# Patient Record
Sex: FEMALE | Race: BLACK OR AFRICAN AMERICAN | NOT HISPANIC OR LATINO
[De-identification: names, ages, dates, MRNs, and addresses within clinical notes are randomized per-mention and may not be internally consistent; named-entity substitution may affect disease eponyms.]

---

## 2017-02-02 ENCOUNTER — APPOINTMENT (OUTPATIENT)
Dept: PULMONOLOGY | Facility: CLINIC | Age: 62
End: 2017-02-02

## 2017-02-02 VITALS
HEIGHT: 62 IN | BODY MASS INDEX: 31.47 KG/M2 | OXYGEN SATURATION: 91 % | WEIGHT: 171 LBS | SYSTOLIC BLOOD PRESSURE: 120 MMHG | RESPIRATION RATE: 17 BRPM | HEART RATE: 73 BPM | TEMPERATURE: 97.2 F | DIASTOLIC BLOOD PRESSURE: 70 MMHG

## 2017-02-03 LAB
ALBUMIN SERPL ELPH-MCNC: 3.8 G/DL
ALP BLD-CCNC: 98 U/L
ALT SERPL-CCNC: 9 U/L
ANION GAP SERPL CALC-SCNC: 14 MMOL/L
AST SERPL-CCNC: 19 U/L
BASOPHILS # BLD AUTO: 0.01 K/UL
BASOPHILS NFR BLD AUTO: 0.2 %
BILIRUB SERPL-MCNC: 0.4 MG/DL
BUN SERPL-MCNC: 15 MG/DL
CALCIUM SERPL-MCNC: 9.8 MG/DL
CHLORIDE SERPL-SCNC: 101 MMOL/L
CO2 SERPL-SCNC: 24 MMOL/L
CREAT SERPL-MCNC: 0.82 MG/DL
EOSINOPHIL # BLD AUTO: 0.35 K/UL
EOSINOPHIL NFR BLD AUTO: 7.4 %
GLUCOSE SERPL-MCNC: 78 MG/DL
HCT VFR BLD CALC: 35.6 %
HGB BLD-MCNC: 10.6 G/DL
IMM GRANULOCYTES NFR BLD AUTO: 0.2 %
LYMPHOCYTES # BLD AUTO: 1.85 K/UL
LYMPHOCYTES NFR BLD AUTO: 39.4 %
MAN DIFF?: NORMAL
MCHC RBC-ENTMCNC: 26.2 PG
MCHC RBC-ENTMCNC: 29.8 GM/DL
MCV RBC AUTO: 88.1 FL
MONOCYTES # BLD AUTO: 0.39 K/UL
MONOCYTES NFR BLD AUTO: 8.3 %
NEUTROPHILS # BLD AUTO: 2.09 K/UL
NEUTROPHILS NFR BLD AUTO: 44.5 %
PLATELET # BLD AUTO: 214 K/UL
POTASSIUM SERPL-SCNC: 3.9 MMOL/L
PROT SERPL-MCNC: 8.6 G/DL
RBC # BLD: 4.04 M/UL
RBC # FLD: 15.9 %
SODIUM SERPL-SCNC: 139 MMOL/L
TSH SERPL-ACNC: 1.8 UIU/ML
WBC # FLD AUTO: 4.7 K/UL

## 2017-05-01 ENCOUNTER — APPOINTMENT (OUTPATIENT)
Dept: PULMONOLOGY | Facility: CLINIC | Age: 62
End: 2017-05-01

## 2017-05-01 VITALS
OXYGEN SATURATION: 90 % | BODY MASS INDEX: 31.47 KG/M2 | TEMPERATURE: 97.8 F | HEART RATE: 82 BPM | WEIGHT: 171 LBS | HEIGHT: 62 IN | RESPIRATION RATE: 17 BRPM

## 2017-05-30 ENCOUNTER — MEDICATION RENEWAL (OUTPATIENT)
Age: 62
End: 2017-05-30

## 2017-10-13 ENCOUNTER — APPOINTMENT (OUTPATIENT)
Dept: PULMONOLOGY | Facility: CLINIC | Age: 62
End: 2017-10-13
Payer: MEDICAID

## 2017-10-13 VITALS
RESPIRATION RATE: 18 BRPM | WEIGHT: 170 LBS | HEIGHT: 62 IN | DIASTOLIC BLOOD PRESSURE: 70 MMHG | TEMPERATURE: 97.8 F | HEART RATE: 89 BPM | SYSTOLIC BLOOD PRESSURE: 120 MMHG | BODY MASS INDEX: 31.28 KG/M2

## 2017-10-13 PROCEDURE — 36415 COLL VENOUS BLD VENIPUNCTURE: CPT

## 2017-10-13 PROCEDURE — 99215 OFFICE O/P EST HI 40 MIN: CPT | Mod: 25

## 2017-10-16 LAB
25(OH)D3 SERPL-MCNC: 24.1 NG/ML
ALBUMIN SERPL ELPH-MCNC: 3.8 G/DL
ALP BLD-CCNC: 96 U/L
ALT SERPL-CCNC: 10 U/L
ANION GAP SERPL CALC-SCNC: 16 MMOL/L
AST SERPL-CCNC: 13 U/L
BASOPHILS # BLD AUTO: 0.01 K/UL
BASOPHILS NFR BLD AUTO: 0.2 %
BILIRUB SERPL-MCNC: 0.3 MG/DL
BUN SERPL-MCNC: 15 MG/DL
CALCIUM SERPL-MCNC: 9.9 MG/DL
CHLORIDE SERPL-SCNC: 103 MMOL/L
CO2 SERPL-SCNC: 25 MMOL/L
CREAT SERPL-MCNC: 0.94 MG/DL
DEPRECATED KAPPA LC FREE/LAMBDA SER: 2.28 RATIO
EOSINOPHIL # BLD AUTO: 0.27 K/UL
EOSINOPHIL NFR BLD AUTO: 5.3 %
GLUCOSE SERPL-MCNC: 104 MG/DL
HBA1C MFR BLD HPLC: 6 %
HCT VFR BLD CALC: 32.8 %
HGB BLD-MCNC: 10.2 G/DL
IGA SER QL IEP: 377 MG/DL
IGG SER QL IEP: 2120 MG/DL
IGM SER QL IEP: 99 MG/DL
IMM GRANULOCYTES NFR BLD AUTO: 0 %
KAPPA LC CSF-MCNC: 2.18 MG/DL
KAPPA LC SERPL-MCNC: 4.98 MG/DL
LYMPHOCYTES # BLD AUTO: 1.22 K/UL
LYMPHOCYTES NFR BLD AUTO: 24 %
MAN DIFF?: NORMAL
MCHC RBC-ENTMCNC: 27.1 PG
MCHC RBC-ENTMCNC: 31.1 GM/DL
MCV RBC AUTO: 87.2 FL
MONOCYTES # BLD AUTO: 0.29 K/UL
MONOCYTES NFR BLD AUTO: 5.7 %
NEUTROPHILS # BLD AUTO: 3.3 K/UL
NEUTROPHILS NFR BLD AUTO: 64.8 %
PLATELET # BLD AUTO: 203 K/UL
POTASSIUM SERPL-SCNC: 4.1 MMOL/L
PROT SERPL-MCNC: 8.6 G/DL
RBC # BLD: 3.76 M/UL
RBC # FLD: 15.7 %
SODIUM SERPL-SCNC: 144 MMOL/L
TSH SERPL-ACNC: 1.97 UIU/ML
WBC # FLD AUTO: 5.09 K/UL

## 2017-10-17 LAB — TOTAL IGE SMQN RAST: 119 KU/L

## 2017-11-01 ENCOUNTER — MEDICATION RENEWAL (OUTPATIENT)
Age: 62
End: 2017-11-01

## 2017-11-29 ENCOUNTER — MEDICATION RENEWAL (OUTPATIENT)
Age: 62
End: 2017-11-29

## 2018-03-20 ENCOUNTER — MED ADMIN CHARGE (OUTPATIENT)
Age: 63
End: 2018-03-20

## 2018-03-20 ENCOUNTER — APPOINTMENT (OUTPATIENT)
Dept: PULMONOLOGY | Facility: CLINIC | Age: 63
End: 2018-03-20
Payer: MEDICAID

## 2018-03-20 VITALS
HEART RATE: 95 BPM | RESPIRATION RATE: 18 BRPM | TEMPERATURE: 98.6 F | WEIGHT: 167 LBS | OXYGEN SATURATION: 91 % | BODY MASS INDEX: 30.73 KG/M2 | DIASTOLIC BLOOD PRESSURE: 84 MMHG | SYSTOLIC BLOOD PRESSURE: 130 MMHG | HEIGHT: 62 IN

## 2018-03-20 DIAGNOSIS — R06.2 WHEEZING: ICD-10-CM

## 2018-03-20 PROCEDURE — 94640 AIRWAY INHALATION TREATMENT: CPT

## 2018-03-20 PROCEDURE — 96372 THER/PROPH/DIAG INJ SC/IM: CPT

## 2018-03-20 PROCEDURE — 99215 OFFICE O/P EST HI 40 MIN: CPT | Mod: 25

## 2018-03-20 RX ORDER — ALBUTEROL SULFATE 2.5 MG/3ML
(2.5 MG/3ML) SOLUTION RESPIRATORY (INHALATION) 4 TIMES DAILY
Refills: 0 | Status: COMPLETED | OUTPATIENT
Start: 2018-03-20

## 2018-03-20 RX ORDER — METHYLPREDNISOLONE 40 MG/ML
40 INJECTION, POWDER, LYOPHILIZED, FOR SOLUTION INTRAMUSCULAR; INTRAVENOUS
Qty: 1 | Refills: 0 | Status: COMPLETED | OUTPATIENT
Start: 2018-03-20

## 2018-03-20 RX ADMIN — ALBUTEROL SULFATE 0 0.083%: 2.5 SOLUTION RESPIRATORY (INHALATION) at 00:00

## 2018-03-20 RX ADMIN — Medication 1 MG: at 00:00

## 2018-03-21 ENCOUNTER — MOBILE ON CALL (OUTPATIENT)
Age: 63
End: 2018-03-21

## 2018-03-30 ENCOUNTER — MEDICATION RENEWAL (OUTPATIENT)
Age: 63
End: 2018-03-30

## 2018-04-24 ENCOUNTER — APPOINTMENT (OUTPATIENT)
Dept: PULMONOLOGY | Facility: CLINIC | Age: 63
End: 2018-04-24
Payer: MEDICARE

## 2018-04-24 VITALS
WEIGHT: 166 LBS | TEMPERATURE: 98 F | OXYGEN SATURATION: 95 % | RESPIRATION RATE: 18 BRPM | SYSTOLIC BLOOD PRESSURE: 100 MMHG | BODY MASS INDEX: 30.55 KG/M2 | DIASTOLIC BLOOD PRESSURE: 60 MMHG | HEART RATE: 85 BPM | HEIGHT: 62 IN

## 2018-04-24 PROCEDURE — 99215 OFFICE O/P EST HI 40 MIN: CPT | Mod: 25

## 2018-04-24 PROCEDURE — 36415 COLL VENOUS BLD VENIPUNCTURE: CPT

## 2018-04-25 LAB
ALBUMIN SERPL ELPH-MCNC: 3.8 G/DL
ALP BLD-CCNC: 85 U/L
ALT SERPL-CCNC: 13 U/L
ANION GAP SERPL CALC-SCNC: 13 MMOL/L
AST SERPL-CCNC: 17 U/L
BASOPHILS # BLD AUTO: 0.01 K/UL
BASOPHILS NFR BLD AUTO: 0.2 %
BILIRUB SERPL-MCNC: 0.2 MG/DL
BUN SERPL-MCNC: 21 MG/DL
CALCIUM SERPL-MCNC: 9.2 MG/DL
CHLORIDE SERPL-SCNC: 98 MMOL/L
CO2 SERPL-SCNC: 25 MMOL/L
CREAT SERPL-MCNC: 0.88 MG/DL
EOSINOPHIL # BLD AUTO: 0.19 K/UL
EOSINOPHIL NFR BLD AUTO: 3.8 %
FERRITIN SERPL-MCNC: 43 NG/ML
GLUCOSE SERPL-MCNC: 90 MG/DL
HCT VFR BLD CALC: 38.3 %
HGB BLD-MCNC: 11.5 G/DL
IMM GRANULOCYTES NFR BLD AUTO: 0.2 %
LYMPHOCYTES # BLD AUTO: 1.73 K/UL
LYMPHOCYTES NFR BLD AUTO: 34.3 %
MAN DIFF?: NORMAL
MCHC RBC-ENTMCNC: 25.7 PG
MCHC RBC-ENTMCNC: 30 GM/DL
MCV RBC AUTO: 85.7 FL
MONOCYTES # BLD AUTO: 0.47 K/UL
MONOCYTES NFR BLD AUTO: 9.3 %
NEUTROPHILS # BLD AUTO: 2.64 K/UL
NEUTROPHILS NFR BLD AUTO: 52.2 %
PLATELET # BLD AUTO: 215 K/UL
POTASSIUM SERPL-SCNC: 4.4 MMOL/L
PROT SERPL-MCNC: 8.5 G/DL
RBC # BLD: 4.47 M/UL
RBC # FLD: 17.1 %
SODIUM SERPL-SCNC: 136 MMOL/L
WBC # FLD AUTO: 5.05 K/UL

## 2018-08-02 ENCOUNTER — MEDICATION RENEWAL (OUTPATIENT)
Age: 63
End: 2018-08-02

## 2018-09-06 ENCOUNTER — APPOINTMENT (OUTPATIENT)
Dept: PULMONOLOGY | Facility: CLINIC | Age: 63
End: 2018-09-06

## 2018-09-06 ENCOUNTER — APPOINTMENT (OUTPATIENT)
Dept: PULMONOLOGY | Facility: CLINIC | Age: 63
End: 2018-09-06
Payer: MEDICAID

## 2018-09-06 VITALS
WEIGHT: 176 LBS | DIASTOLIC BLOOD PRESSURE: 70 MMHG | HEIGHT: 62 IN | RESPIRATION RATE: 16 BRPM | TEMPERATURE: 98.4 F | BODY MASS INDEX: 32.39 KG/M2 | HEART RATE: 81 BPM | SYSTOLIC BLOOD PRESSURE: 110 MMHG

## 2018-09-06 PROCEDURE — 94618 PULMONARY STRESS TESTING: CPT

## 2018-09-06 PROCEDURE — 99215 OFFICE O/P EST HI 40 MIN: CPT

## 2018-09-06 RX ORDER — AZITHROMYCIN 250 MG/1
250 TABLET, FILM COATED ORAL
Qty: 5 | Refills: 0 | Status: DISCONTINUED | COMMUNITY
Start: 2018-03-20 | End: 2018-09-06

## 2018-09-10 LAB
ALBUMIN SERPL ELPH-MCNC: 4 G/DL
ALP BLD-CCNC: 87 U/L
ALT SERPL-CCNC: 11 U/L
ANION GAP SERPL CALC-SCNC: 16 MMOL/L
AST SERPL-CCNC: 21 U/L
BILIRUB SERPL-MCNC: 0.2 MG/DL
BUN SERPL-MCNC: 16 MG/DL
CALCIUM SERPL-MCNC: 9.8 MG/DL
CHLORIDE SERPL-SCNC: 103 MMOL/L
CO2 SERPL-SCNC: 25 MMOL/L
CREAT SERPL-MCNC: 0.86 MG/DL
NT-PROBNP SERPL-MCNC: 45 PG/ML
POTASSIUM SERPL-SCNC: 4.3 MMOL/L
PROT SERPL-MCNC: 8.5 G/DL
SODIUM SERPL-SCNC: 144 MMOL/L
TSH SERPL-ACNC: 1.53 UIU/ML

## 2018-09-17 ENCOUNTER — MEDICATION RENEWAL (OUTPATIENT)
Age: 63
End: 2018-09-17

## 2018-09-20 ENCOUNTER — MOBILE ON CALL (OUTPATIENT)
Age: 63
End: 2018-09-20

## 2018-12-11 ENCOUNTER — APPOINTMENT (OUTPATIENT)
Dept: PULMONOLOGY | Facility: CLINIC | Age: 63
End: 2018-12-11

## 2018-12-20 ENCOUNTER — MEDICATION RENEWAL (OUTPATIENT)
Age: 63
End: 2018-12-20

## 2019-02-14 ENCOUNTER — MEDICATION RENEWAL (OUTPATIENT)
Age: 64
End: 2019-02-14

## 2019-03-12 ENCOUNTER — APPOINTMENT (OUTPATIENT)
Dept: PULMONOLOGY | Facility: CLINIC | Age: 64
End: 2019-03-12
Payer: MEDICARE

## 2019-03-12 ENCOUNTER — APPOINTMENT (OUTPATIENT)
Dept: PULMONOLOGY | Facility: CLINIC | Age: 64
End: 2019-03-12
Payer: MEDICAID

## 2019-03-12 VITALS
SYSTOLIC BLOOD PRESSURE: 123 MMHG | HEART RATE: 75 BPM | DIASTOLIC BLOOD PRESSURE: 80 MMHG | HEIGHT: 62 IN | WEIGHT: 170 LBS | RESPIRATION RATE: 16 BRPM | TEMPERATURE: 98.1 F | BODY MASS INDEX: 31.28 KG/M2

## 2019-03-12 PROCEDURE — 99215 OFFICE O/P EST HI 40 MIN: CPT | Mod: 25

## 2019-03-12 PROCEDURE — 36415 COLL VENOUS BLD VENIPUNCTURE: CPT

## 2019-03-12 PROCEDURE — 94618 PULMONARY STRESS TESTING: CPT

## 2019-03-12 PROCEDURE — ZZZZZ: CPT

## 2019-03-12 NOTE — PHYSICAL EXAM
[General Appearance - Well Developed] : well developed [Normal Appearance] : normal appearance [Well Groomed] : well groomed [General Appearance - Well Nourished] : well nourished [No Deformities] : no deformities [General Appearance - In No Acute Distress] : no acute distress [Normal Conjunctiva] : the conjunctiva exhibited no abnormalities [Eyelids - No Xanthelasma] : the eyelids demonstrated no xanthelasmas [II] : II [Neck Appearance] : the appearance of the neck was normal [Neck Cervical Mass (___cm)] : no neck mass was observed [Jugular Venous Distention Increased] : there was no jugular-venous distention [Thyroid Diffuse Enlargement] : the thyroid was not enlarged [Thyroid Nodule] : there were no palpable thyroid nodules [Heart Rate And Rhythm] : heart rate and rhythm were normal [Heart Sounds] : normal S1 and S2 [Exaggerated Use Of Accessory Muscles For Inspiration] : no accessory muscle use [Abdomen Soft] : soft [Abdomen Tenderness] : non-tender [] : no hepato-splenomegaly [Abdomen Mass (___ Cm)] : no abdominal mass palpated [Abnormal Walk] : normal gait [Gait - Sufficient For Exercise Testing] : the gait was sufficient for exercise testing [Cyanosis, Localized] : no localized cyanosis [Deep Tendon Reflexes (DTR)] : deep tendon reflexes were 2+ and symmetric [Sensation] : the sensory exam was normal to light touch and pinprick [No Focal Deficits] : no focal deficits [Impaired Insight] : insight and judgment were intact [Normal Oral Mucosa] : normal oral mucosa [No Oral Pallor] : no oral pallor [No Oral Cyanosis] : no oral cyanosis [Oriented To Time, Place, And Person] : oriented to person, place, and time [Affect] : the affect was normal [Mood] : the mood was normal [No Anxiety] : not feeling anxious [FreeTextEntry1] : clubbing, no pedal edema\par

## 2019-03-12 NOTE — CONSULT LETTER
[Dear  ___] : Dear  [unfilled], [DrVu  ___] : Dr. YORK [DrVu ___] : Dr. YORK [Mary Perez MD, FCCP] : Mary Perez MD, FCCP [Director, Pulmonary Hypertension Program] : Director, Pulmonary Hypertension Program [[facility]] : [facility] [Division of Pulmonary, Critical Care and Sleep Medicine] : Division of Pulmonary, Critical Care and Sleep Medicine [Associate Professor of Medicine] : Associate Professor of Medicine

## 2019-03-12 NOTE — HISTORY OF PRESENT ILLNESS
[None] : The patient is currently asymptomatic [Difficulty Breathing During Exertion] : stable dyspnea on exertion [Oxygen] : the patient uses supplemental oxygen [Oxygen Rate  ___ lpm] : Oxygen rate is [unfilled] lpm [Class II - Mild Symptoms and Slight Limitations] : II [Group I - Pulmonary Arterial HTN] : Group I - Pulmonary Arterial HTN: [Collagen Vascular Disease] : Collagen Vascular Disease [Former] : is a former smoker [Adherent] : the patient is adherent with ~his/her~ medication regimen [FreeTextEntry1] : -------65 yo F initially diagnosed with scleroderma back when she was in her 20s.  She has interstitial lung disease with pulmonary hypertension.  She had a RHC in 2008 which did not reveal  elevated PASP with  ? vasodilator responsiveness.  has scleroderma- \par She also has history of significant esophageal involvement.  She underwent esophageal dilatation in 2009 which helped with her recurrent aspiration.  She also has history of sickle cell trait and history of hypothyroidism. Her exercise tolerance has remanned the same.  Medication wise, she is one, levothyroxine, furosemide, lisinopril, Letairis. and nebulizers. ----------She was previously on Ventavis & Revatio. ---NOW ON     Letaris  and Symbicort AND PROMISES TO BE COMPLIANT TO TREATMENT ---- ----\par \par ----PFT 9/1/15 restrictive lung defect w/bronchodilator response and severely decreased DLCO. ------\par PFT  SEPT [id number]----MHAPNLNE OF RESTRICTIVE ND OBSTRUCTIVE LUNG DEFECT, DECREASED DLCO----\par ----- \par - 6 MWT  ---2018----SEPT 228m \par 2016  342   METERS\par \par CT SCAN  CHEST      MARCH 2107--OUTSIDE  FACILITY---FILMS NOT AVAILABLE  FOR REVIEW  ----EMPHYSEMA  WITH BASILAR FIBROSIS IN THE SETTING OF SCLERODERMA---\par ----- She is using 3l/min of oxygen at present and complaint with her medication.--------------\par --\par ------MAR 2018-------is having cough and yellow sputum since 1 week ago----denies any fever \par or chills----is on 2L oxygen-----is on letairis\par \par ------march 2019 PAH-------doing well from pulmonary stand pint-----on letairis----tolerating it well------pt states she had a CT scan done at [facility] [NO FILMS AVAILABLE]-----she's on oxygen at 2 lpm x 24 hrs\par

## 2019-03-12 NOTE — REASON FOR VISIT
[Follow-Up] : a follow-up visit [Shortness of Breath] : shortness of Breath [Pulmonary Hypertension] : pulmonary hypertension [FreeTextEntry1] :  DYSPNEA [FreeTextEntry2] : scleroderma

## 2019-03-12 NOTE — END OF VISIT
[] : Fellow [>50% of Time Spent on Counseling for ____] : Greater than 50% of the encounter time was spent on counseling for [unfilled] [Time Spent: ___ minutes] : I have spent [unfilled] minutes of face to face time with the patient [FreeTextEntry2] : Patient seen and examined, agree with fellows assessment and plan. [FreeTextEntry1] : Fran

## 2019-03-13 LAB
ALBUMIN SERPL ELPH-MCNC: 4 G/DL
ALP BLD-CCNC: 99 U/L
ALT SERPL-CCNC: 8 U/L
ANION GAP SERPL CALC-SCNC: 10 MMOL/L
AST SERPL-CCNC: 13 U/L
BASOPHILS # BLD AUTO: 0.02 K/UL
BASOPHILS NFR BLD AUTO: 0.3 %
BILIRUB SERPL-MCNC: 0.2 MG/DL
BUN SERPL-MCNC: 14 MG/DL
CALCIUM SERPL-MCNC: 9.3 MG/DL
CHLORIDE SERPL-SCNC: 101 MMOL/L
CO2 SERPL-SCNC: 29 MMOL/L
CREAT SERPL-MCNC: 0.79 MG/DL
EOSINOPHIL # BLD AUTO: 0.38 K/UL
EOSINOPHIL NFR BLD AUTO: 6.1 %
HCT VFR BLD CALC: 35.2 %
HGB BLD-MCNC: 10.5 G/DL
IMM GRANULOCYTES NFR BLD AUTO: 0.3 %
LYMPHOCYTES # BLD AUTO: 1.57 K/UL
LYMPHOCYTES NFR BLD AUTO: 25.2 %
MAN DIFF?: NORMAL
MCHC RBC-ENTMCNC: 26.9 PG
MCHC RBC-ENTMCNC: 29.8 GM/DL
MCV RBC AUTO: 90 FL
MONOCYTES # BLD AUTO: 0.44 K/UL
MONOCYTES NFR BLD AUTO: 7.1 %
NEUTROPHILS # BLD AUTO: 3.8 K/UL
NEUTROPHILS NFR BLD AUTO: 61 %
NT-PROBNP SERPL-MCNC: 44 PG/ML
PLATELET # BLD AUTO: 175 K/UL
POTASSIUM SERPL-SCNC: 3.9 MMOL/L
PROT SERPL-MCNC: 8.1 G/DL
RBC # BLD: 3.91 M/UL
RBC # FLD: 15.7 %
SODIUM SERPL-SCNC: 140 MMOL/L
WBC # FLD AUTO: 6.23 K/UL

## 2019-04-15 ENCOUNTER — APPOINTMENT (OUTPATIENT)
Dept: PULMONOLOGY | Facility: CLINIC | Age: 64
End: 2019-04-15

## 2019-04-29 ENCOUNTER — MEDICATION RENEWAL (OUTPATIENT)
Age: 64
End: 2019-04-29

## 2019-07-01 ENCOUNTER — MEDICATION RENEWAL (OUTPATIENT)
Age: 64
End: 2019-07-01

## 2019-07-25 ENCOUNTER — APPOINTMENT (OUTPATIENT)
Dept: PULMONOLOGY | Facility: CLINIC | Age: 64
End: 2019-07-25
Payer: MEDICAID

## 2019-07-25 VITALS
OXYGEN SATURATION: 95 % | SYSTOLIC BLOOD PRESSURE: 121 MMHG | DIASTOLIC BLOOD PRESSURE: 82 MMHG | HEIGHT: 62 IN | HEART RATE: 78 BPM | TEMPERATURE: 98 F | RESPIRATION RATE: 16 BRPM | BODY MASS INDEX: 30.91 KG/M2 | WEIGHT: 168 LBS

## 2019-07-25 PROCEDURE — 36415 COLL VENOUS BLD VENIPUNCTURE: CPT

## 2019-07-25 PROCEDURE — 99215 OFFICE O/P EST HI 40 MIN: CPT | Mod: 25

## 2019-07-25 NOTE — CONSULT LETTER
[Dear  ___] : Dear  [unfilled], [DrVu  ___] : Dr. YORK [DrVu ___] : Dr. YORK [Mary Perez MD, FCCP] : Mary Perez MD, FCCP [Director, Pulmonary Hypertension Program] : Director, Pulmonary Hypertension Program [[facility]] : [facility] [Division of Pulmonary, Critical Care and Sleep Medicine] : Division of Pulmonary, Critical Care and Sleep Medicine [Associate Professor of Medicine] : Associate Professor of Medicine

## 2019-07-25 NOTE — HISTORY OF PRESENT ILLNESS
[None] : The patient is currently asymptomatic [Difficulty Breathing During Exertion] : stable dyspnea on exertion [Oxygen] : the patient uses supplemental oxygen [Oxygen Rate  ___ lpm] : Oxygen rate is [unfilled] lpm [Class II - Mild Symptoms and Slight Limitations] : II [Group I - Pulmonary Arterial HTN] : Group I - Pulmonary Arterial HTN: [Collagen Vascular Disease] : Collagen Vascular Disease [Former] : is a former smoker [Adherent] : the patient is adherent with ~his/her~ medication regimen [FreeTextEntry1] : -------65 yo F initially diagnosed with scleroderma back when she was in her 20s.  She has interstitial lung disease with pulmonary hypertension.  She had a RHC in 2008 which did not reveal  elevated PASP with  ? vasodilator responsiveness.  has scleroderma- \par She also has history of significant esophageal involvement.  She underwent esophageal dilatation in 2009 which helped with her recurrent aspiration.  She also has history of sickle cell trait and history of hypothyroidism. Her exercise tolerance has remanned the same.  Medication wise, she is one, levothyroxine, furosemide, lisinopril, Letairis. and nebulizers. ----------She was previously on Ventavis & Revatio. ---NOW ON     Letaris  and Symbicort AND PROMISES TO BE COMPLIANT TO TREATMENT ---- ----\par \par ----PFT 9/1/15 restrictive lung defect w/bronchodilator response and severely decreased DLCO. ------\par PFT  SEPT [id number]----ROHFPGCY OF RESTRICTIVE ND OBSTRUCTIVE LUNG DEFECT, DECREASED DLCO----\par ----- \par - 6 MWT  ---2018----SEPT 228m \par 2016  342   METERS\par \par CT SCAN  CHEST      MARCH 2107--OUTSIDE  FACILITY---FILMS NOT AVAILABLE  FOR REVIEW  ----EMPHYSEMA  WITH BASILAR FIBROSIS IN THE SETTING OF SCLERODERMA---\par ----- She is using 3l/min of oxygen at present and complaint with her medication.--------------\par --\par ------MAR 2018-------is having cough and yellow sputum since 1 week ago----denies any fever \par or chills----is on 2L oxygen-----is on letairis\par \par -july 2019 PAH-------doing well from pulmonary stand pint-----on letairis----tolerating it well------pt states she had a CT scan done at [facility] [NO FILMS AVAILABLE]-----she's on oxygen at 2 lpm x 24 hrs -NNEDS REPAT ECHO\par

## 2019-07-26 ENCOUNTER — MEDICATION RENEWAL (OUTPATIENT)
Age: 64
End: 2019-07-26

## 2019-07-26 LAB
25(OH)D3 SERPL-MCNC: 15.5 NG/ML
ALBUMIN SERPL ELPH-MCNC: 3.9 G/DL
ALP BLD-CCNC: 89 U/L
ALT SERPL-CCNC: 11 U/L
ANION GAP SERPL CALC-SCNC: 12 MMOL/L
AST SERPL-CCNC: 10 U/L
BASOPHILS # BLD AUTO: 0.02 K/UL
BASOPHILS NFR BLD AUTO: 0.5 %
BILIRUB SERPL-MCNC: 0.3 MG/DL
BUN SERPL-MCNC: 13 MG/DL
CALCIUM SERPL-MCNC: 9.4 MG/DL
CHLORIDE SERPL-SCNC: 104 MMOL/L
CO2 SERPL-SCNC: 27 MMOL/L
CREAT SERPL-MCNC: 0.65 MG/DL
DEPRECATED KAPPA LC FREE/LAMBDA SER: 2.16 RATIO
EOSINOPHIL # BLD AUTO: 0.33 K/UL
EOSINOPHIL NFR BLD AUTO: 7.5 %
GLUCOSE SERPL-MCNC: 89 MG/DL
HCT VFR BLD CALC: 35.8 %
HGB BLD-MCNC: 10.6 G/DL
IGA SER QL IEP: 373 MG/DL
IGG SER QL IEP: 2169 MG/DL
IGM SER QL IEP: 82 MG/DL
IMM GRANULOCYTES NFR BLD AUTO: 0.2 %
KAPPA LC CSF-MCNC: 1.54 MG/DL
KAPPA LC SERPL-MCNC: 3.33 MG/DL
LYMPHOCYTES # BLD AUTO: 1.41 K/UL
LYMPHOCYTES NFR BLD AUTO: 32 %
MAN DIFF?: NORMAL
MCHC RBC-ENTMCNC: 26.8 PG
MCHC RBC-ENTMCNC: 29.6 GM/DL
MCV RBC AUTO: 90.6 FL
MONOCYTES # BLD AUTO: 0.38 K/UL
MONOCYTES NFR BLD AUTO: 8.6 %
NEUTROPHILS # BLD AUTO: 2.25 K/UL
NEUTROPHILS NFR BLD AUTO: 51.2 %
NT-PROBNP SERPL-MCNC: 117 PG/ML
PLATELET # BLD AUTO: 188 K/UL
POTASSIUM SERPL-SCNC: 4.1 MMOL/L
PROT SERPL-MCNC: 8.1 G/DL
RBC # BLD: 3.95 M/UL
RBC # FLD: 15 %
SODIUM SERPL-SCNC: 143 MMOL/L
TSH SERPL-ACNC: 1.27 UIU/ML
WBC # FLD AUTO: 4.4 K/UL

## 2019-08-01 LAB — TOTAL IGE SMQN RAST: 65 KU/L

## 2019-08-02 ENCOUNTER — MEDICATION RENEWAL (OUTPATIENT)
Age: 64
End: 2019-08-02

## 2019-09-24 ENCOUNTER — APPOINTMENT (OUTPATIENT)
Dept: PULMONOLOGY | Facility: CLINIC | Age: 64
End: 2019-09-24

## 2019-10-25 ENCOUNTER — MEDICATION RENEWAL (OUTPATIENT)
Age: 64
End: 2019-10-25

## 2019-12-06 NOTE — CONSULT LETTER
[DrVu ___] : Dr. YORK [DrVu  ___] : Dr. YORK [Dear  ___] : Dear  [unfilled], [Mary Perez MD, FCCP] : Mary Perez MD, FCCP [Director, Pulmonary Hypertension Program] : Director, Pulmonary Hypertension Program [Division of Pulmonary, Critical Care and Sleep Medicine] : Division of Pulmonary, Critical Care and Sleep Medicine [Associate Professor of Medicine] : Associate Professor of Medicine [[facility]] : [facility]

## 2019-12-09 ENCOUNTER — APPOINTMENT (OUTPATIENT)
Dept: PULMONOLOGY | Facility: CLINIC | Age: 64
End: 2019-12-09
Payer: MEDICARE

## 2019-12-09 VITALS
RESPIRATION RATE: 17 BRPM | WEIGHT: 164.38 LBS | DIASTOLIC BLOOD PRESSURE: 78 MMHG | HEART RATE: 79 BPM | SYSTOLIC BLOOD PRESSURE: 117 MMHG | TEMPERATURE: 98 F | OXYGEN SATURATION: 90 % | BODY MASS INDEX: 30.06 KG/M2

## 2019-12-09 VITALS — WEIGHT: 165 LBS | BODY MASS INDEX: 29.23 KG/M2 | HEIGHT: 63 IN

## 2019-12-09 PROCEDURE — ZZZZZ: CPT

## 2019-12-09 PROCEDURE — 94010 BREATHING CAPACITY TEST: CPT

## 2019-12-09 PROCEDURE — 99215 OFFICE O/P EST HI 40 MIN: CPT | Mod: 25

## 2019-12-09 RX ORDER — METRONIDAZOLE 500 MG/1
500 TABLET ORAL TWICE DAILY
Qty: 14 | Refills: 0 | Status: DISCONTINUED | COMMUNITY
Start: 2019-07-25 | End: 2019-12-09

## 2019-12-09 NOTE — END OF VISIT
[] : Fellow [Time Spent: ___ minutes] : I have spent [unfilled] minutes of face to face time with the patient [>50% of Time Spent on Counseling for ____] : Greater than 50% of the encounter time was spent on counseling for [unfilled] [FreeTextEntry2] : Patient seen and examined, agree with fellows assessment and plan. [FreeTextEntry1] : Fran

## 2019-12-09 NOTE — PHYSICAL EXAM
[General Appearance - Well Developed] : well developed [Well Groomed] : well groomed [Normal Appearance] : normal appearance [No Deformities] : no deformities [General Appearance - In No Acute Distress] : no acute distress [General Appearance - Well Nourished] : well nourished [Normal Conjunctiva] : the conjunctiva exhibited no abnormalities [Eyelids - No Xanthelasma] : the eyelids demonstrated no xanthelasmas [Neck Appearance] : the appearance of the neck was normal [II] : II [Jugular Venous Distention Increased] : there was no jugular-venous distention [Neck Cervical Mass (___cm)] : no neck mass was observed [Thyroid Diffuse Enlargement] : the thyroid was not enlarged [Thyroid Nodule] : there were no palpable thyroid nodules [Heart Sounds] : normal S1 and S2 [Heart Rate And Rhythm] : heart rate and rhythm were normal [Exaggerated Use Of Accessory Muscles For Inspiration] : no accessory muscle use [Abdomen Soft] : soft [Abdomen Tenderness] : non-tender [] : no hepato-splenomegaly [Abdomen Mass (___ Cm)] : no abdominal mass palpated [Gait - Sufficient For Exercise Testing] : the gait was sufficient for exercise testing [Abnormal Walk] : normal gait [Cyanosis, Localized] : no localized cyanosis [Sensation] : the sensory exam was normal to light touch and pinprick [No Focal Deficits] : no focal deficits [Deep Tendon Reflexes (DTR)] : deep tendon reflexes were 2+ and symmetric [Impaired Insight] : insight and judgment were intact [Normal Oral Mucosa] : normal oral mucosa [No Oral Pallor] : no oral pallor [No Oral Cyanosis] : no oral cyanosis [Affect] : the affect was normal [Oriented To Time, Place, And Person] : oriented to person, place, and time [Mood] : the mood was normal [No Anxiety] : not feeling anxious [FreeTextEntry1] : sclerodactly\par

## 2019-12-09 NOTE — DISCUSSION/SUMMARY
[FreeTextEntry1] : ----------ASSESSMENT PLAN--------63 year old female  Patient has history of scleroderma with positive sjogrens antibodies.   ------    She has interstitial lung disease and pulmonary hypertension.  She will continue on oxygen, Lasix, LETARIS   ------HAS NOT FOLLOW UP WITH  Dr. Aguilar regarding her rheumatologic workup. -----------\par \par -------PAH----STABLE FROM PULMONARY POINT OF VIEW-------WILL CONTINUE WITH LETAIRIS--and oxygen therapy----------NEED REPORTS FROM [facility]-------\par \par ----LABS DONE TODAY---------\par \par ----\par --Thanks for allowing  me to participate  in the care of this patient.  Patient at this time  will follow  the above mentioned recommendations and return back for follow up visit. If you have any questions  I can be reached  at [phone]  or  [phone].  \par \par Mary Perez MD, FCCP \par Director, Pulmonary Hypertension Program \par [facility] \par Division of Pulmonary, Critical Care and Sleep Medicine \par  Professor of Medicine \par [employer]\par

## 2019-12-09 NOTE — HISTORY OF PRESENT ILLNESS
[None] : The patient is currently asymptomatic [Difficulty Breathing During Exertion] : stable dyspnea on exertion [Oxygen Rate  ___ lpm] : Oxygen rate is [unfilled] lpm [Oxygen] : the patient uses supplemental oxygen [Group I - Pulmonary Arterial HTN] : Group I - Pulmonary Arterial HTN: [Class II - Mild Symptoms and Slight Limitations] : II [Former] : is a former smoker [Collagen Vascular Disease] : Collagen Vascular Disease [Adherent] : the patient is adherent with ~his/her~ medication regimen [FreeTextEntry1] : -------63 yo F initially diagnosed with scleroderma back when she was in her 20s.  She has interstitial lung disease with pulmonary hypertension.  She had a RHC in 2008 which did not reveal  elevated PASP with  ? vasodilator responsiveness.  has scleroderma- \par She also has history of significant esophageal involvement.  She underwent esophageal dilatation in 2009 which helped with her recurrent aspiration.  She also has history of sickle cell trait and history of hypothyroidism. Her exercise tolerance has remanned the same.  Medication wise, she is one, levothyroxine, furosemide, lisinopril, Letairis. and nebulizers. ----------She was previously on Ventavis & Revatio. ---NOW ON     Letaris  and Symbicort AND PROMISES TO BE COMPLIANT TO TREATMENT ---- ----\par \par ----PFT------- --DEC 2019-------UNABLE TO DO A COMPLETE PFT-------9/1/15 restrictive lung defect w/bronchodilator response and severely decreased DLCO. ------\par \par PFT  SEPT 2107----EVIDENCE OF RESTRICTIVE ND OBSTRUCTIVE LUNG DEFECT, DECREASED DLCO----\par ----- \par - 6 MWT  ---2018----SEPT 228m \par 2016  342   METERS\par \par CT SCAN  CHEST      MARCH 2107--OUTSIDE  FACILITY---FILMS NOT AVAILABLE  FOR REVIEW  ----EMPHYSEMA  WITH BASILAR FIBROSIS IN THE SETTING OF SCLERODERMA---\par ----- She is using 3l/min of oxygen at present and complaint with her medication.--------------\par --\par ------MAR 2018-------is having cough and yellow sputum since 1 week ago----denies any fever \par or chills----is on 2L oxygen-----is on letairis\par \par -july 2019 PAH-------doing well from pulmonary stand pint-----on letairis----tolerating it well------pt states she had a CT scan done at [facility] [NO FILMS AVAILABLE]-----she's on oxygen at 2 lpm x 24 hrs -NNEDS REPAT ECHO\par \par -----DEC  2019-----------doing well from pulmonary stand pint-----on letairis----tolerating it well------pt states she had a CT scan done and ECHO  at [facility] [NO FILMS AVAILABLE]-----she's on oxygen at 2 lpm x 24 hrs------

## 2020-05-04 ENCOUNTER — APPOINTMENT (OUTPATIENT)
Dept: PULMONOLOGY | Facility: CLINIC | Age: 65
End: 2020-05-04
Payer: MEDICARE

## 2020-05-04 PROCEDURE — 99443: CPT

## 2020-05-04 NOTE — DISCUSSION/SUMMARY
[FreeTextEntry1] : -ASSESSMENT PLAN--------64year old female Patient has history of scleroderma with positive sjogrens antibodies. ------ She has interstitial lung disease and pulmonary hypertension. She will continue on oxygen, Lasix, LETARIS ------HAS NOT FOLLOW UP WITH Dr. Aguilar regarding her rheumatologic workup. -----------\par \par -------PAH----STABLE FROM PULMONARY POINT OF VIEW-------WILL CONTINUE WITH LETAIRIS--and oxygen therapy----------NEED REPORTS FROM [facility]-------\par \par ----LABS to be ordered for home draw-------\par \par ----reviewed covid precautions\par ----f/u in july 2020\par \par --Thanks for allowing me to participate in the care of this patient. Patient at this time will follow the above mentioned recommendations and return back for follow up visit. If you have any questions I can be reached at [phone] or [phone]. \par \par Mary Perez MD, FCCP \par Director, Pulmonary Hypertension Program \par [facility] \par Division of Pulmonary, Critical Care and Sleep Medicine \par  Professor of Medicine \par [employer]\par . \par

## 2020-05-04 NOTE — HISTORY OF PRESENT ILLNESS
[Home] : at home, [unfilled] , at the time of the visit. [Medical Office: ([facility])___] : at the medical office located in  [Patient] : the patient [Self] : self [TextBox_4] : [facility] telehealth visit conducted via phone w/pt Dr Perez and Keysha\par \par 63 yo F initially diagnosed with scleroderma back when she was in her 20s. She has interstitial lung disease with pulmonary hypertension. She had a RHC in 2008 which did not reveal elevated PASP with ? vasodilator responsiveness. has scleroderma- \par She also has history of significant esophageal involvement. She underwent esophageal dilatation in 2009 which helped with her recurrent aspiration. She also has history of sickle cell trait and history of hypothyroidism. Her exercise tolerance has remanned the same. Medication wise, she is one, levothyroxine, furosemide, lisinopril, Letairis. and nebulizers. ----------She was previously on Ventavis & Revatio. ---NOW ON Letaris and Symbicort \par \par she has no current resp complaints, using oxygen at 2 lpm contiuously

## 2020-08-06 ENCOUNTER — APPOINTMENT (OUTPATIENT)
Dept: PULMONOLOGY | Facility: CLINIC | Age: 65
End: 2020-08-06

## 2020-09-24 ENCOUNTER — APPOINTMENT (OUTPATIENT)
Dept: PULMONOLOGY | Facility: CLINIC | Age: 65
End: 2020-09-24
Payer: MEDICARE

## 2020-09-24 VITALS
RESPIRATION RATE: 18 BRPM | OXYGEN SATURATION: 96 % | SYSTOLIC BLOOD PRESSURE: 136 MMHG | BODY MASS INDEX: 28.87 KG/M2 | WEIGHT: 163 LBS | TEMPERATURE: 97.2 F | HEART RATE: 88 BPM | DIASTOLIC BLOOD PRESSURE: 91 MMHG

## 2020-09-24 LAB
ALBUMIN SERPL ELPH-MCNC: 4.1 G/DL
ALP BLD-CCNC: 92 U/L
ALT SERPL-CCNC: 8 U/L
ANION GAP SERPL CALC-SCNC: 11 MMOL/L
AST SERPL-CCNC: 10 U/L
BASOPHILS # BLD AUTO: 0.02 K/UL
BASOPHILS NFR BLD AUTO: 0.4 %
BILIRUB SERPL-MCNC: 0.2 MG/DL
BUN SERPL-MCNC: 11 MG/DL
CALCIUM SERPL-MCNC: 9.3 MG/DL
CHLORIDE SERPL-SCNC: 103 MMOL/L
CO2 SERPL-SCNC: 28 MMOL/L
CREAT SERPL-MCNC: 0.72 MG/DL
EOSINOPHIL # BLD AUTO: 0.29 K/UL
EOSINOPHIL NFR BLD AUTO: 6.1 %
GLUCOSE SERPL-MCNC: 113 MG/DL
HCT VFR BLD CALC: 33.9 %
HGB BLD-MCNC: 10.1 G/DL
IMM GRANULOCYTES NFR BLD AUTO: 0.2 %
LYMPHOCYTES # BLD AUTO: 1.33 K/UL
LYMPHOCYTES NFR BLD AUTO: 27.9 %
MAN DIFF?: NORMAL
MCHC RBC-ENTMCNC: 27.3 PG
MCHC RBC-ENTMCNC: 29.8 GM/DL
MCV RBC AUTO: 91.6 FL
MONOCYTES # BLD AUTO: 0.43 K/UL
MONOCYTES NFR BLD AUTO: 9 %
NEUTROPHILS # BLD AUTO: 2.68 K/UL
NEUTROPHILS NFR BLD AUTO: 56.4 %
NT-PROBNP SERPL-MCNC: 135 PG/ML
PLATELET # BLD AUTO: 190 K/UL
POTASSIUM SERPL-SCNC: 3.9 MMOL/L
PROT SERPL-MCNC: 8 G/DL
RBC # BLD: 3.7 M/UL
RBC # FLD: 14.6 %
SODIUM SERPL-SCNC: 142 MMOL/L
WBC # FLD AUTO: 4.76 K/UL

## 2020-09-24 PROCEDURE — 36415 COLL VENOUS BLD VENIPUNCTURE: CPT

## 2020-09-24 PROCEDURE — 99215 OFFICE O/P EST HI 40 MIN: CPT | Mod: 25

## 2020-09-24 PROCEDURE — 94618 PULMONARY STRESS TESTING: CPT

## 2020-09-24 PROCEDURE — ZZZZZ: CPT

## 2020-09-24 NOTE — HISTORY OF PRESENT ILLNESS
[TextBox_4] : ---66 yo F initially diagnosed with scleroderma back when she was in her 20s.  She has interstitial lung disease with pulmonary hypertension.  She had a RHC in 2008 which did not reveal  elevated PASP with  ? vasodilator responsiveness.  has scleroderma- \par She also has history of significant esophageal involvement.  She underwent esophageal dilatation in 2009 which helped with her recurrent aspiration.  She also has history of sickle cell trait and history of hypothyroidism. Her exercise tolerance has remanned the same.  Medication wise, she is one, levothyroxine, furosemide, lisinopril, Letairis. and nebulizers. ----------She was previously on Ventavis & Revatio. ---NOW ON     Letaris  and Symbicort AND PROMISES TO BE COMPLIANT TO TREATMENT ---- ----\par \par ----PFT------- --DEC 2019-------UNABLE TO DO A COMPLETE PFT-------9/1/15 restrictive lung defect w/bronchodilator response and severely decreased DLCO. ------\par \par PFT  SEPT 2107----EVIDENCE OF RESTRICTIVE ND OBSTRUCTIVE LUNG DEFECT, DECREASED DLCO----\par ----- \par - 6 MWT  ---2018----SEPT 228m \par 2016  342   METERS\par 2020   6MWT  ON SUPPLEMENTAL O2     206 METERS \par CT SCAN  CHEST      MARCH 2107--OUTSIDE  FACILITY---FILMS NOT AVAILABLE  FOR REVIEW  ----EMPHYSEMA  WITH BASILAR FIBROSIS IN THE SETTING OF SCLERODERMA---\par ----- She is using 3l/min of oxygen at present and complaint with her medication.--------------\par --\par ------MAR 2018-------is having cough and yellow sputum since 1 week ago----denies any fever \par or chills----is on 2L oxygen-----is on letairis\par \par -july 2019 PAH-------doing well from pulmonary stand pint-----on letairis----tolerating it well------pt states she had a CT scan done at [facility] [NO FILMS AVAILABLE]-----she's on oxygen at 2 lpm x 24 hrs -NNEDS REPAT ECHO\par \par -----DEC  2019-----------doing well from pulmonary stand pint-----on letairis----tolerating it well------pt states she had a CT scan done and ECHO  at [facility] [NO FILMS AVAILABLE]-----she's on oxygen at 2 lpm x 24 hrs------\par \par \par sept 2020 pulm htn- on letairis, she has no current resp complaints, using oxygen at 2 lpm contiuously\par Follows [facility]- gets diagnostic tests done there

## 2020-09-24 NOTE — DISCUSSION/SUMMARY
[FreeTextEntry1] : -ASSESSMENT PLAN--------65year old female Patient has history of scleroderma with positive sjogrens antibodies. ------ She has interstitial lung disease and pulmonary hypertension. She will continue on oxygen, Lasix, LETARIS ------HAS NOT FOLLOW UP WITH Dr. Aguilar regarding her rheumatologic workup. -----------\par \par -------PAH----STABLE FROM PULMONARY POINT OF VIEW-------WILL CONTINUE WITH LETAIRIS--and oxygen therapy----------NEED REPORTS FROM [facility]---(echo and ct chest) ----\par -----I explained to her that we need full set of PFT and a new CAT scan of the chest but she has not followed up on that\par --labs drawn in our office today\par she is up to date with influenza vac\par --Follow-up in December after PFT and a CT chest\par -Echo results to be obtained from  [facility]\par \par \par f/u with 3 months \par \par --Thanks for allowing me to participate in the care of this patient. Patient at this time will follow the above mentioned recommendations and return back for follow up visit. If you have any questions I can be reached at [phone] or [phone]. \par \par Mary Perez MD, FCCP \par Director, Pulmonary Hypertension Program \par [facility] \par Division of Pulmonary, Critical Care and Sleep Medicine \par  Professor of Medicine \par [employer]\par . \par

## 2020-09-25 LAB
SARS-COV-2 IGG SERPL IA-ACNC: <0.1 INDEX
SARS-COV-2 IGG SERPL QL IA: NEGATIVE

## 2020-09-25 NOTE — DISCUSSION/SUMMARY
[FreeTextEntry1] : ----------ASSESSMENT PLAN--------63 year old female  Patient has history of scleroderma with positive SjÃƒÂ¶gren antibodies.   ------    She has interstitial lung disease and pulmonary hypertension.  She will continue on oxygen, Lasix, LETARIS   ------HAS NOT FOLLOW UP WITH  Dr. Aguilar regarding her rheumatologic workup. -----------\par \par \par -------PAH----STABLE FROM PULMONARY POINT OF VIEW-------WILL CONTINUE WITH LETAIRIS--and oxygen therapy---NEED REPEAT   ECHO\par ----Labs drawn today to check creat and electrolytes given diuretic therapy and cbc checked  given h/o anemia--\par --CALLED DR LUTZ AND DR ROSALIO CEDEÑO OFFICE FOR RECORDS  --SHE FOLLOWS IN THE CLINIC AT [facility] \[facility] --Thanks for allowing  me to participate  in the care of this patient.  Patient at this time  will follow  the above mentioned recommendations and return back for follow up visit. If you have any questions  I can be reached  at [phone]  or  [phone].  \par \par Mary Perez MD, FCCP \par Director, Pulmonary Hypertension Program \par [facility] \par Division of Pulmonary, Critical Care and Sleep Medicine \par  Professor of Medicine \par [employer]\par 
147

## 2020-11-11 ENCOUNTER — RX RENEWAL (OUTPATIENT)
Age: 65
End: 2020-11-11

## 2021-01-15 ENCOUNTER — APPOINTMENT (OUTPATIENT)
Dept: PULMONOLOGY | Facility: CLINIC | Age: 66
End: 2021-01-15
Payer: MEDICAID

## 2021-01-15 VITALS
TEMPERATURE: 97.9 F | WEIGHT: 168 LBS | BODY MASS INDEX: 29.77 KG/M2 | HEIGHT: 63 IN | OXYGEN SATURATION: 89 % | HEART RATE: 71 BPM | SYSTOLIC BLOOD PRESSURE: 119 MMHG | DIASTOLIC BLOOD PRESSURE: 75 MMHG

## 2021-01-15 LAB
BASOPHILS # BLD AUTO: 0.03 K/UL
BASOPHILS NFR BLD AUTO: 0.7 %
EOSINOPHIL # BLD AUTO: 0.37 K/UL
EOSINOPHIL NFR BLD AUTO: 9 %
HCT VFR BLD CALC: 34.9 %
HGB BLD-MCNC: 10.7 G/DL
IMM GRANULOCYTES NFR BLD AUTO: 0 %
LYMPHOCYTES # BLD AUTO: 1.46 K/UL
LYMPHOCYTES NFR BLD AUTO: 35.5 %
MAN DIFF?: NORMAL
MCHC RBC-ENTMCNC: 27.6 PG
MCHC RBC-ENTMCNC: 30.7 GM/DL
MCV RBC AUTO: 89.9 FL
MONOCYTES # BLD AUTO: 0.45 K/UL
MONOCYTES NFR BLD AUTO: 10.9 %
NEUTROPHILS # BLD AUTO: 1.8 K/UL
NEUTROPHILS NFR BLD AUTO: 43.9 %
NT-PROBNP SERPL-MCNC: 58 PG/ML
PLATELET # BLD AUTO: 153 K/UL
RBC # BLD: 3.88 M/UL
RBC # FLD: 14.1 %
SARS-COV-2 IGG SERPL IA-ACNC: 0.07 INDEX
SARS-COV-2 IGG SERPL QL IA: NEGATIVE
WBC # FLD AUTO: 4.11 K/UL

## 2021-01-15 PROCEDURE — 99215 OFFICE O/P EST HI 40 MIN: CPT | Mod: 25

## 2021-01-15 NOTE — DISCUSSION/SUMMARY
[FreeTextEntry1] : -ASSESSMENT PLAN--------65year old female Patient has history of scleroderma with positive sjogrens antibodies. ------ She has interstitial lung disease and pulmonary hypertension. She will continue on oxygen, Lasix, LETARIS ------HAS NOT FOLLOW UP WITH Dr. Aguilar regarding her rheumatologic workup. -----------\par \par -------PAH----STABLE FROM PULMONARY POINT OF VIEW-------WILL CONTINUE WITH LETAIRIS--and oxygen therapy----------NEED REPORTS FROM [facility]---(echo and ct chest) ----\par -----I explained to her that we need full set of PFT and a new CAT scan of the chest but she has not followed up on that\par --labs drawn in our office today\par she is up to date with influenza vac\par \par \par \par f/u with 6 months \par \par --Thanks for allowing me to participate in the care of this patient. Patient at this time will follow the above mentioned recommendations and return back for follow up visit. If you have any questions I can be reached at [phone] or [phone]. \par \par Mary Perez MD, [facility] \par Director, Pulmonary Hypertension Program \par [facility] \par Division of Pulmonary, Critical Care and Sleep Medicine \par  Professor of Medicine \par [employer]\par . \par

## 2021-01-15 NOTE — HISTORY OF PRESENT ILLNESS
[TextBox_4] : ---64 yo F initially diagnosed with scleroderma back when she was in her 20s.  She has interstitial lung disease with pulmonary hypertension.  She had a RHC in 2008 which did not reveal  elevated PASP with  ? vasodilator responsiveness.  has scleroderma- \par She also has history of significant esophageal involvement.  She underwent esophageal dilatation in 2009 which helped with her recurrent aspiration.  She also has history of sickle cell trait and history of hypothyroidism. Her exercise tolerance has remanned the same.  Medication wise, she is one, levothyroxine, furosemide, lisinopril, Letairis. and nebulizers. ----------She was previously on Ventavis & Revatio. ---NOW ON     Letaris  and Symbicort AND PROMISES TO BE COMPLIANT TO TREATMENT ---- ----\par \par ----PFT------- --DEC 2019-------UNABLE TO DO A COMPLETE PFT-------9/1/15 restrictive lung defect w/bronchodilator response and severely decreased DLCO. ------\par \par PFT  SEPT 2107----EVIDENCE OF RESTRICTIVE ND OBSTRUCTIVE LUNG DEFECT, DECREASED DLCO----\par ----- \par - 6 MWT  ---2018----SEPT 228m \par 2016  342   METERS\par 2020   6MWT  ON SUPPLEMENTAL O2     206 METERS \par CT SCAN  CHEST      MARCH 2107--OUTSIDE  FACILITY---FILMS NOT AVAILABLE  FOR REVIEW  ----EMPHYSEMA  WITH BASILAR FIBROSIS IN THE SETTING OF SCLERODERMA---\par ----- She is using 3l/min of oxygen at present and complaint with her medication.--------------\par --\par ------MAR 2018-------is having cough and yellow sputum since 1 week ago----denies any fever \par or chills----is on 2L oxygen-----is on letairis\par \par -july 2019 PAH-------doing well from pulmonary stand pint-----on letairis----tolerating it well------pt states she had a CT scan done at [facility] [NO FILMS AVAILABLE]-----she's on oxygen at 2 lpm x 24 hrs -NNEDS REPAT ECHO\par \par -----DEC  2019-----------doing well from pulmonary stand pint-----on letairis----tolerating it well------pt states she had a CT scan done and ECHO  at [facility] [NO FILMS AVAILABLE]-----she's on oxygen at 2 lpm x 24 hrs------\par \par \par 1/2021 pulm htn- on letairis, she has no current resp complaints, using oxygen at 2 lpm contiuously\par Follows [facility]- gets diagnostic tests done there

## 2021-01-16 LAB
ALBUMIN SERPL ELPH-MCNC: 4 G/DL
ALP BLD-CCNC: 103 U/L
ALT SERPL-CCNC: 8 U/L
ANION GAP SERPL CALC-SCNC: 19 MMOL/L
AST SERPL-CCNC: 15 U/L
BILIRUB SERPL-MCNC: <0.2 MG/DL
BUN SERPL-MCNC: 14 MG/DL
CALCIUM SERPL-MCNC: 9.6 MG/DL
CHLORIDE SERPL-SCNC: 105 MMOL/L
CO2 SERPL-SCNC: 19 MMOL/L
CREAT SERPL-MCNC: 1 MG/DL
GLUCOSE SERPL-MCNC: 99 MG/DL
POTASSIUM SERPL-SCNC: 4 MMOL/L
PROT SERPL-MCNC: 7.9 G/DL
SODIUM SERPL-SCNC: 143 MMOL/L

## 2021-05-01 ENCOUNTER — RX RENEWAL (OUTPATIENT)
Age: 66
End: 2021-05-01

## 2021-07-15 ENCOUNTER — APPOINTMENT (OUTPATIENT)
Dept: PULMONOLOGY | Facility: CLINIC | Age: 66
End: 2021-07-15
Payer: MEDICAID

## 2021-07-15 VITALS
SYSTOLIC BLOOD PRESSURE: 121 MMHG | WEIGHT: 168 LBS | BODY MASS INDEX: 29.77 KG/M2 | DIASTOLIC BLOOD PRESSURE: 78 MMHG | TEMPERATURE: 97.2 F | HEART RATE: 80 BPM | OXYGEN SATURATION: 99 % | HEIGHT: 63 IN

## 2021-07-15 PROCEDURE — 99215 OFFICE O/P EST HI 40 MIN: CPT | Mod: 25

## 2021-07-15 NOTE — DISCUSSION/SUMMARY
[FreeTextEntry1] : -ASSESSMENT PLAN--------65year old female Patient has history of scleroderma with positive sjogrens antibodies. ------ She has interstitial lung disease and pulmonary hypertension. She will continue on oxygen, Lasix, LETARIS ------HAS NOT FOLLOW UP WITH Dr. Aguilar regarding her rheumatologic workup. -----------  HER ECHO AND CT ARE DOEN AT [facility]   ON SUPPLEMENTAL        O 2 liters per minute\par \par 1-------PAH----STABLE FROM PULMONARY POINT OF VIEW-------WILL CONTINUE WITH LETAIRIS--and oxygen therapy----\par 2 ---HAS SLIGHT ILD  AS NOTICE ON CT SCAN NEEDS PFT---\par \par 3)--labs drawn in our office today\par 4) Strongly urged to get covid vac\par 3) cleared from pulm perspective for colonoscopy but procedure must be done in a hospital setting as pt is high risk for any procedure performed under general anesthesia in view of her pulmonary hypertension and mild interstitial lung disease\par \par \par f/u with 6 months \par \par --Thanks for allowing me to participate in the care of this patient. Patient at this time will follow the above mentioned recommendations and return back for follow up visit. If you have any questions I can be reached at [phone] or [phone]. \par \par Mary Perez MD, FCCP \par Director, Pulmonary Hypertension Program \par [facility] \par Division of Pulmonary, Critical Care and Sleep Medicine \par  Professor of Medicine \par [employer]\par \par HOWARD Pollard\par \par . \par

## 2021-07-15 NOTE — HISTORY OF PRESENT ILLNESS
[TextBox_4] : ---64 yo F initially diagnosed with scleroderma back when she was in her 20s.  She has interstitial lung disease with pulmonary hypertension.  She had a RHC in 2008 which did not reveal  elevated PASP with  ? vasodilator responsiveness.  has scleroderma- \par She also has history of significant esophageal involvement.  She underwent esophageal dilatation in 2009 which helped with her recurrent aspiration.  She also has history of sickle cell trait and history of hypothyroidism. Her exercise tolerance has remanned the same.  Medication wise, she is one, levothyroxine, furosemide, lisinopril, Letairis. and nebulizers. ----------She was previously on Ventavis & Revatio. ---NOW ON     Letaris  and Symbicort AND PROMISES TO BE COMPLIANT TO TREATMENT ---- ----\par \par ----PFT------- --DEC 2019-------UNABLE TO DO A COMPLETE PFT-------9/1/15 restrictive lung defect w/bronchodilator response and severely decreased DLCO. ------\par \par PFT  SEPT 2107----EVIDENCE OF RESTRICTIVE ND OBSTRUCTIVE LUNG DEFECT, DECREASED DLCO----\par ----- \par - 6 MWT  ---2018----SEPT 228m \par 2016  342   METERS\par 2020   6MWT  ON SUPPLEMENTAL O2     206 METERS \par CT SCAN  CHEST      MARCH 2107--OUTSIDE  FACILITY---FILMS NOT AVAILABLE  FOR REVIEW  ----EMPHYSEMA  WITH BASILAR FIBROSIS IN THE SETTING OF SCLERODERMA---\par \par CT CHEST 12/2020  --[facility]    peripheral and basilar end stage fibrotic , moderate and severe centrilobular emphysema\par ----- She is using 3l/min of oxygen at present and complaint with her medication.--------------\par --\par ------MAR 2018-------is having cough and yellow sputum since 1 week ago----denies any fever \par or chills----is on 2L oxygen-----is on letairis\par \par -july 2019 PAH-------doing well from pulmonary stand pint-----on letairis----tolerating it well------pt states she had a CT scan done at [facility] [NO FILMS AVAILABLE]-----she's on oxygen at 2 lpm x 24 hrs -NNEDS REPAT ECHO\par \par -----DEC  2019-----------doing well from pulmonary stand pint-----on letairis----tolerating it well------pt states she had a CT scan done and ECHO  at [facility] [NO FILMS AVAILABLE]-----she's on oxygen at 2 lpm x 24 hrs------\par \par \par echo 12/2020 ---[facility]---est pasp 36mmHg\par 1/2021 pulm htn- on letairis, she has no current resp complaints, using oxygen at 2 lpm contiuously\par Follows [facility]- gets diagnostic tests done there\par \par 7/2021 pulm htn d/t scleroderma- on letairis and diuretics- on oxygen- stable from resp perspective\par c/o abd pain- following GI - was told she needs a colonoscopy ("stool backed up")\par She has not yet received covid vac

## 2021-07-16 DIAGNOSIS — E53.8 DEFICIENCY OF OTHER SPECIFIED B GROUP VITAMINS: ICD-10-CM

## 2021-07-16 LAB
25(OH)D3 SERPL-MCNC: 24.2 NG/ML
ALBUMIN SERPL ELPH-MCNC: 4 G/DL
ALP BLD-CCNC: 87 U/L
ALT SERPL-CCNC: 6 U/L
ANION GAP SERPL CALC-SCNC: 12 MMOL/L
AST SERPL-CCNC: 13 U/L
BASOPHILS # BLD AUTO: 0.02 K/UL
BASOPHILS NFR BLD AUTO: 0.5 %
BILIRUB SERPL-MCNC: 0.2 MG/DL
BUN SERPL-MCNC: 15 MG/DL
CALCIUM SERPL-MCNC: 9.9 MG/DL
CHLORIDE SERPL-SCNC: 104 MMOL/L
CO2 SERPL-SCNC: 27 MMOL/L
COVID-19 NUCLEOCAPSID  GAM ANTIBODY INTERPRETATION: NEGATIVE
CREAT SERPL-MCNC: 0.8 MG/DL
EOSINOPHIL # BLD AUTO: 0.37 K/UL
EOSINOPHIL NFR BLD AUTO: 8.7 %
GLUCOSE SERPL-MCNC: 96 MG/DL
HCT VFR BLD CALC: 34.9 %
HGB BLD-MCNC: 10.7 G/DL
IMM GRANULOCYTES NFR BLD AUTO: 0.2 %
LYMPHOCYTES # BLD AUTO: 1.45 K/UL
LYMPHOCYTES NFR BLD AUTO: 34 %
MAN DIFF?: NORMAL
MCHC RBC-ENTMCNC: 26.8 PG
MCHC RBC-ENTMCNC: 30.7 GM/DL
MCV RBC AUTO: 87.5 FL
MONOCYTES # BLD AUTO: 0.42 K/UL
MONOCYTES NFR BLD AUTO: 9.8 %
NEUTROPHILS # BLD AUTO: 2 K/UL
NEUTROPHILS NFR BLD AUTO: 46.8 %
NT-PROBNP SERPL-MCNC: 97 PG/ML
PLATELET # BLD AUTO: 193 K/UL
POTASSIUM SERPL-SCNC: 4.1 MMOL/L
PROT SERPL-MCNC: 7.9 G/DL
RBC # BLD: 3.99 M/UL
RBC # FLD: 15.1 %
SARS-COV-2 AB SERPL QL IA: 0.11 INDEX
SODIUM SERPL-SCNC: 142 MMOL/L
TSH SERPL-ACNC: 1.28 UIU/ML
VIT B12 SERPL-MCNC: 252 PG/ML
WBC # FLD AUTO: 4.27 K/UL

## 2021-08-20 ENCOUNTER — NON-APPOINTMENT (OUTPATIENT)
Age: 66
End: 2021-08-20

## 2021-11-08 ENCOUNTER — APPOINTMENT (OUTPATIENT)
Dept: PULMONOLOGY | Facility: CLINIC | Age: 66
End: 2021-11-08
Payer: MEDICARE

## 2021-11-08 VITALS
TEMPERATURE: 97.6 F | HEART RATE: 73 BPM | WEIGHT: 164.25 LBS | BODY MASS INDEX: 29.1 KG/M2 | HEIGHT: 63 IN | SYSTOLIC BLOOD PRESSURE: 116 MMHG | RESPIRATION RATE: 17 BRPM | OXYGEN SATURATION: 98 % | DIASTOLIC BLOOD PRESSURE: 76 MMHG

## 2021-11-08 DIAGNOSIS — M34.81 SYSTEMIC SCLEROSIS WITH LUNG INVOLVEMENT: ICD-10-CM

## 2021-11-08 LAB
25(OH)D3 SERPL-MCNC: 27.5 NG/ML
ALBUMIN SERPL ELPH-MCNC: 4 G/DL
ALP BLD-CCNC: 92 U/L
ALT SERPL-CCNC: 8 U/L
ANION GAP SERPL CALC-SCNC: 15 MMOL/L
AST SERPL-CCNC: 11 U/L
BASOPHILS # BLD AUTO: 0.02 K/UL
BASOPHILS NFR BLD AUTO: 0.5 %
BILIRUB SERPL-MCNC: 0.3 MG/DL
BUN SERPL-MCNC: 13 MG/DL
CALCIUM SERPL-MCNC: 9.8 MG/DL
CHLORIDE SERPL-SCNC: 99 MMOL/L
CO2 SERPL-SCNC: 26 MMOL/L
COVID-19 SPIKE DOMAIN ANTIBODY INTERPRETATION: POSITIVE
CREAT SERPL-MCNC: 0.76 MG/DL
EOSINOPHIL # BLD AUTO: 0.25 K/UL
EOSINOPHIL NFR BLD AUTO: 6.1 %
FERRITIN SERPL-MCNC: 38 NG/ML
GLUCOSE SERPL-MCNC: 95 MG/DL
HCT VFR BLD CALC: 34.8 %
HGB BLD-MCNC: 11 G/DL
IMM GRANULOCYTES NFR BLD AUTO: 0.2 %
LYMPHOCYTES # BLD AUTO: 1.57 K/UL
LYMPHOCYTES NFR BLD AUTO: 38.1 %
MAN DIFF?: NORMAL
MCHC RBC-ENTMCNC: 27.6 PG
MCHC RBC-ENTMCNC: 31.6 GM/DL
MCV RBC AUTO: 87.2 FL
MONOCYTES # BLD AUTO: 0.39 K/UL
MONOCYTES NFR BLD AUTO: 9.5 %
NEUTROPHILS # BLD AUTO: 1.88 K/UL
NEUTROPHILS NFR BLD AUTO: 45.6 %
NT-PROBNP SERPL-MCNC: 23 PG/ML
PLATELET # BLD AUTO: 211 K/UL
POTASSIUM SERPL-SCNC: 4 MMOL/L
PROT SERPL-MCNC: 8.4 G/DL
RBC # BLD: 3.99 M/UL
RBC # FLD: 15.4 %
SARS-COV-2 AB SERPL IA-ACNC: >250 U/ML
SODIUM SERPL-SCNC: 139 MMOL/L
TSH SERPL-ACNC: 2.21 UIU/ML
VIT B12 SERPL-MCNC: 276 PG/ML
WBC # FLD AUTO: 4.12 K/UL

## 2021-11-08 PROCEDURE — 76604 US EXAM CHEST: CPT

## 2021-11-08 PROCEDURE — 99215 OFFICE O/P EST HI 40 MIN: CPT | Mod: 25

## 2021-11-08 RX ORDER — PREDNISONE 10 MG/1
10 TABLET ORAL DAILY
Qty: 30 | Refills: 0 | Status: DISCONTINUED | COMMUNITY
Start: 2018-03-20 | End: 2021-11-08

## 2021-11-08 RX ORDER — PREDNISONE 5 MG/1
5 TABLET ORAL
Qty: 45 | Refills: 2 | Status: DISCONTINUED | COMMUNITY
Start: 2018-04-24 | End: 2021-11-08

## 2021-11-08 NOTE — HISTORY OF PRESENT ILLNESS
[TextBox_4] : ---67 yo F initially diagnosed with scleroderma back when she was in her 20s.  She has interstitial lung disease with pulmonary hypertension.  She had a RHC in 2008 which did not reveal  elevated PASP with  ? vasodilator responsiveness.  has scleroderma- \par She also has history of significant esophageal involvement.  She underwent esophageal dilatation in 2009 which helped with her recurrent aspiration.  She also has history of sickle cell trait and history of hypothyroidism. Her exercise tolerance has remanned the same.  Medication wise, she is one, levothyroxine, furosemide, lisinopril, Letairis. and nebulizers. ----------She was previously on Ventavis & Revatio. ---NOW ON     Letaris  and Symbicort AND PROMISES TO BE COMPLIANT TO TREATMENT ---- ----\par \par ----PFT------- --DEC 2019-------UNABLE TO DO A COMPLETE PFT-------9/1/15 restrictive lung defect w/bronchodilator response and severely decreased DLCO. ------\par \par PFT  SEPT 2107----EVIDENCE OF RESTRICTIVE ND OBSTRUCTIVE LUNG DEFECT, DECREASED DLCO----\par ----- \par - 6 MWT  ---2018----SEPT 228m \par 2016  342   METERS\par 2020   6MWT  ON SUPPLEMENTAL O2     206 METERS \par CT SCAN  CHEST      MARCH 2107--OUTSIDE  FACILITY---FILMS NOT AVAILABLE  FOR REVIEW  ----EMPHYSEMA  WITH BASILAR FIBROSIS IN THE SETTING OF SCLERODERMA---\par \par CT CHEST 12/2020  --[facility]    peripheral and basilar end stage fibrotic , moderate and severe centrilobular emphysema\par ----- She is using 3l/min of oxygen at present and complaint with her medication.--------------\par --\par ------MAR 2018-------is having cough and yellow sputum since 1 week ago----denies any fever \par or chills----is on 2L oxygen-----is on letairis\par \par -july 2019 PAH-------doing well from pulmonary stand pint-----on letairis----tolerating it well------pt states she had a CT scan done at [facility] [NO FILMS AVAILABLE]-----she's on oxygen at 2 lpm x 24 hrs -NNEDS REPAT ECHO\par \par -----DEC  2019-----------doing well from pulmonary stand pint-----on letairis----tolerating it well------pt states she had a CT scan done and ECHO  at [facility] [NO FILMS AVAILABLE]-----she's on oxygen at 2 lpm x 24 hrs------\par \par \par echo 12/2020 ---[facility]---est pasp 36mmHg\par 1/2021 pulm htn- on letairis, she has no current resp complaints, using oxygen at 2 lpm contiuously\par Follows [facility]- gets diagnostic tests done there\par \par 7/2021 pulm htn d/t scleroderma- on letairis and diuretics- on oxygen- stable from resp perspective\par c/o abd pain- following GI - was told she needs a colonoscopy ("stool backed up")\par She has not yet received covid vac\par \par \par \par 11/2021 pulm htn d/t scleroderma- on letairis and diuretics- on oxygen- stable from resp perspective. Getting testing at [facility]. She is up to date w/covid and influenza vac

## 2021-11-08 NOTE — DISCUSSION/SUMMARY
[FreeTextEntry1] : -ASSESSMENT PLAN--------65year old female Patient has history of scleroderma with positive sjogrens antibodies. ------ She has interstitial lung disease and pulmonary hypertension. She will continue on oxygen, Lasix, LETARIS ------HAS NOT FOLLOW UP WITH Dr. Aguilar regarding her rheumatologic workup. -----------  HER ECHO AND CT ARE DOEN AT [facility]   ON SUPPLEMENTAL        O 2 liters per minute\par \par \par BED SIDE ULTRASOUND---NOV 8 2021--- bilateral diaphragmatic movements are normal--- no pleural effusion--- bilateral diffuse B-lines-------- mild TR--RV looks normal-\par 1-------PAH----STABLE FROM PULMONARY POINT OF VIEW-------WILL CONTINUE WITH LETAIRIS--and oxygen therapy----\par 2 ---HAS SLIGHT ILD  AS NOTICE ON CT SCAN NEEDS PFT---and repeat CT chest\par \par 3)--labs drawn in our office today\par 4) bedside u/s performed- see scanned report\par 5)  f/u in 4 m\par \par --Thanks for allowing me to participate in the care of this patient. Patient at this time will follow the above mentioned recommendations and return back for follow up visit. If you have any questions I can be reached at [phone] or [phone]. \par \par Mary Perez MD, [facility] \par Director, Pulmonary Hypertension Program \par [facility] \par Division of Pulmonary, Critical Care and Sleep Medicine \par  Professor of Medicine \par [employer]\par \par HOWARD Pollard\par \par . \par

## 2021-11-09 LAB
ESTIMATED AVERAGE GLUCOSE: 123 MG/DL
HBA1C MFR BLD HPLC: 5.9 %

## 2022-02-04 ENCOUNTER — RX RENEWAL (OUTPATIENT)
Age: 67
End: 2022-02-04

## 2022-03-16 ENCOUNTER — NON-APPOINTMENT (OUTPATIENT)
Age: 67
End: 2022-03-16

## 2022-03-17 ENCOUNTER — APPOINTMENT (OUTPATIENT)
Dept: PULMONOLOGY | Facility: CLINIC | Age: 67
End: 2022-03-17
Payer: MEDICARE

## 2022-03-17 VITALS
HEIGHT: 63 IN | SYSTOLIC BLOOD PRESSURE: 127 MMHG | WEIGHT: 169 LBS | TEMPERATURE: 97.7 F | BODY MASS INDEX: 29.95 KG/M2 | HEART RATE: 80 BPM | OXYGEN SATURATION: 99 % | DIASTOLIC BLOOD PRESSURE: 84 MMHG | RESPIRATION RATE: 15 BRPM

## 2022-03-17 DIAGNOSIS — Z01.84 ENCOUNTER FOR ANTIBODY RESPONSE EXAMINATION: ICD-10-CM

## 2022-03-17 LAB
COVID-19 NUCLEOCAPSID  GAM ANTIBODY INTERPRETATION: POSITIVE
COVID-19 SPIKE DOMAIN ANTIBODY INTERPRETATION: POSITIVE
NT-PROBNP SERPL-MCNC: 118 PG/ML
SARS-COV-2 AB SERPL IA-ACNC: >250 U/ML
SARS-COV-2 AB SERPL QL IA: 132 INDEX
SARS-COV-2 RNA CT RESP QN NAA+PROBE: NEGATIVE

## 2022-03-17 PROCEDURE — 99215 OFFICE O/P EST HI 40 MIN: CPT | Mod: 25

## 2022-03-17 PROCEDURE — ZZZZZ: CPT

## 2022-03-17 PROCEDURE — 94010 BREATHING CAPACITY TEST: CPT

## 2022-03-17 PROCEDURE — 99072 ADDL SUPL MATRL&STAF TM PHE: CPT

## 2022-03-17 NOTE — DISCUSSION/SUMMARY
[FreeTextEntry1] : -ASSESSMENT PLAN--------65year old female Patient has history of scleroderma with positive sjogrens antibodies. ------ She has interstitial lung disease and pulmonary hypertension. She will continue on oxygen, Lasix, LETARIS ------HAS NOT FOLLOW UP WITH Dr. Aguilar regarding her rheumatologic workup. -----------  HER ECHO AND CT ARE DOEN AT [facility]   ON SUPPLEMENTAL        O 2 liters per minute\par \par \par BED SIDE ULTRASOUND---NOV 8 2021--- bilateral diaphragmatic movements are normal--- no pleural effusion--- bilateral diffuse B-lines-------- mild TR--RV looks normal-\par 1-------PAH----STABLE FROM PULMONARY POINT OF VIEW-------WILL CONTINUE WITH LETAIRIS--and oxygen therapy----\par 2 ---HAS SLIGHT ILD  AS NOTICE ON CT SCAN NEEDS PFT---and repeat CT chest--RACHAEL LEONE  \par \par 3)--labs drawn in our office today\par 4) bedside u/s performed- see scanned report\par 5)  f/u in 4 m\par \par --Thanks for allowing me to participate in the care of this patient. Patient at this time will follow the above mentioned recommendations and return back for follow up visit. If you have any questions I can be reached at [phone] or [phone]. \par \par Mary Perez MD, FCCP \par Director, Pulmonary Hypertension Program \par [facility] \par Division of Pulmonary, Critical Care and Sleep Medicine \par  Professor of Medicine \par [facility] [employer]\par \par HOWARD Pollard\par \par . \par

## 2022-03-17 NOTE — HISTORY OF PRESENT ILLNESS
[TextBox_4] : ---65 yo F initially diagnosed with scleroderma back when she was in her 20s.  She has interstitial lung disease with pulmonary hypertension.  She had a RHC in 2008 which did not reveal  elevated PASP with  ? vasodilator responsiveness.  has scleroderma- \par She also has history of significant esophageal involvement.  She underwent esophageal dilatation in 2009 which helped with her recurrent aspiration.  She also has history of sickle cell trait and history of hypothyroidism. Her exercise tolerance has remanned the same.  Medication wise, she is one, levothyroxine, furosemide, lisinopril, Letairis. and nebulizers. ----------She was previously on Ventavis & Revatio. ---NOW ON     Letaris  and Symbicort AND PROMISES TO BE COMPLIANT TO TREATMENT ---- ----\par \par ----PFT------- --DEC 2019-------UNABLE TO DO A COMPLETE PFT-------9/1/15 restrictive lung defect w/bronchodilator response and severely decreased DLCO. ------\par \par PFT  SEPT 2107----EVIDENCE OF RESTRICTIVE ND OBSTRUCTIVE LUNG DEFECT, DECREASED DLCO----\par -PFT SPIROMETRY  2022---SUGGESTIO OF RSTRICTIVE ---- \par - 6 MWT  ---2018----SEPT 228m \par 2016  342   METERS\par 2020   6MWT  ON SUPPLEMENTAL O2     206 METERS \par CT SCAN  CHEST      MARCH 2107--OUTSIDE  FACILITY---FILMS NOT AVAILABLE  FOR REVIEW  ----EMPHYSEMA  WITH BASILAR FIBROSIS IN THE SETTING OF SCLERODERMA---\par \par CT CHEST 12/2020  --[facility]    peripheral and basilar end stage fibrotic , moderate and severe centrilobular emphysema\par ----- She is using 3l/min of oxygen at present and complaint with her medication.--------------\par --\par ------MAR 2018-------is having cough and yellow sputum since 1 week ago----denies any fever \par or chills----is on 2L oxygen-----is on letairis\par \par -july 2019 PAH-------doing well from pulmonary stand pint-----on letairis----tolerating it well------pt states she had a CT scan done at [facility] [NO FILMS AVAILABLE]-----she's on oxygen at 2 lpm x 24 hrs -NNEDS REPAT ECHO\par \par -----DEC  2019-----------doing well from pulmonary stand pint-----on letairis----tolerating it well------pt states she had a CT scan done and ECHO  at [facility] [NO FILMS AVAILABLE]-----she's on oxygen at 2 lpm x 24 hrs------\par \par \par echo 12/2020 ---[facility]---est pasp 36mmHg\par 1/2021 pulm htn- on letairis, she has no current resp complaints, using oxygen at 2 lpm contiuously\par Follows [facility]- gets diagnostic tests done there\par \par 7/2021 pulm htn d/t scleroderma- on letairis and diuretics- on oxygen- stable from resp perspective\par c/o abd pain- following GI - was told she needs a colonoscopy ("stool backed up")\par She has not yet received covid vac\par \par \par \par 11/2021 pulm htn d/t scleroderma- on letairis and diuretics- on oxygen- stable from resp perspective. Getting testing at [facility]. She is up to date w/covid and influenza vac\par \par \par 3/2022  pulm htn d/t scleroderma- on letairis and diuretics- on oxygen @ 2 lpm- stable from resp perspective. STEEN unchanged. Getting testing at [facility]. She is up to date w/covid vac x 2 --needs  echo , ct and  pft

## 2022-03-18 LAB
25(OH)D3 SERPL-MCNC: 19.4 NG/ML
ALBUMIN SERPL ELPH-MCNC: 4 G/DL
ALP BLD-CCNC: 88 U/L
ALT SERPL-CCNC: 7 U/L
ANION GAP SERPL CALC-SCNC: 12 MMOL/L
AST SERPL-CCNC: 8 U/L
BILIRUB SERPL-MCNC: 0.2 MG/DL
BUN SERPL-MCNC: 12 MG/DL
CALCIUM SERPL-MCNC: 9.6 MG/DL
CHLORIDE SERPL-SCNC: 103 MMOL/L
CO2 SERPL-SCNC: 25 MMOL/L
CREAT SERPL-MCNC: 0.69 MG/DL
EGFR: 96 ML/MIN/1.73M2
FERRITIN SERPL-MCNC: 57 NG/ML
GLUCOSE SERPL-MCNC: 97 MG/DL
POTASSIUM SERPL-SCNC: 4.3 MMOL/L
PROT SERPL-MCNC: 7.7 G/DL
SODIUM SERPL-SCNC: 140 MMOL/L
TSH SERPL-ACNC: 2.2 UIU/ML
VIT B12 SERPL-MCNC: 252 PG/ML

## 2022-03-18 RX ORDER — MULTIVIT-MIN/FOLIC/VIT K/LYCOP 400-300MCG
50 MCG TABLET ORAL
Qty: 30 | Refills: 5 | Status: ACTIVE | COMMUNITY
Start: 2019-08-02 | End: 1900-01-01

## 2022-06-16 ENCOUNTER — APPOINTMENT (OUTPATIENT)
Dept: PULMONOLOGY | Facility: CLINIC | Age: 67
End: 2022-06-16

## 2022-07-15 ENCOUNTER — APPOINTMENT (OUTPATIENT)
Dept: PULMONOLOGY | Facility: CLINIC | Age: 67
End: 2022-07-15

## 2022-07-15 VITALS
HEART RATE: 76 BPM | DIASTOLIC BLOOD PRESSURE: 76 MMHG | SYSTOLIC BLOOD PRESSURE: 111 MMHG | BODY MASS INDEX: 30.83 KG/M2 | OXYGEN SATURATION: 93 % | HEIGHT: 63 IN | RESPIRATION RATE: 15 BRPM | TEMPERATURE: 97 F | WEIGHT: 174 LBS

## 2022-07-15 PROCEDURE — 99215 OFFICE O/P EST HI 40 MIN: CPT

## 2022-07-15 NOTE — DISCUSSION/SUMMARY
[FreeTextEntry1] : -ASSESSMENT PLAN--------65year old female Patient has history of scleroderma with positive Sjogrens antibodies. ------ She has interstitial lung disease and pulmonary hypertension. She will continue on oxygen, Lasix, LETARIS ------HAS NOT FOLLOW UP WITH Dr. Aguilar regarding her rheumatologic workup. -----------  HER ECHO AND CT ARE DONE AT [facility]   ON SUPPLEMENTAL        O2 3 liters per minute\par \par BED SIDE ULTRASOUND---NOV 8 2021--- bilateral diaphragmatic movements are normal--- no pleural effusion--- bilateral diffuse B-lines-------- mild TR--RV looks normal.\par \par On 7/15/2022- patient reports  STEEN and LE edema. Echo performed at [facility] on 7/11/2022. \par \par 1-------PAH--------WILL CONTINUE WITH LETAIRIS--and oxygen therapy----. Given increased STEEN and LE edema, will increase lasix to 20mg and 40 mg alternating every other day. Will try to obtain records from [facility] of recent echo---MAY NEEED  ECHO AT [facility] -----WILL Consider TO  ADD INHALED REMODULIN [   TYVASO  ]  FOR SECONDARY PULM HTN  ----- \par 2 ---HAS ILD  AS NOTICE ON CT SCAN---and repeat CT chest--CONE ISALND . Will need repeat CT chest\par 3--Labs drawn in our office today- CBC, CMP and pBNP\par 4-- Given worsening symptoms, may require inpatient evaluation and step up therapy. Will coordinate with patient---ADVISED TO F/U DR BARBA \par \par --Thanks for allowing me to participate in the care of this patient. Patient at this time will follow the above mentioned recommendations and return back for follow up visit. If you have any questions I can be reached at [phone] or [phone]. \par \par Mary Perez MD, FCCP \par Director, Pulmonary Hypertension Program \par [facility] \par Division of Pulmonary, Critical Care and Sleep Medicine \par  Professor of Medicine \par [employer]\par \par HITESH Pollard-MANDY\par \par . \par

## 2022-07-15 NOTE — PHYSICAL EXAM
[No Acute Distress] : no acute distress [Normal Oropharynx] : normal oropharynx [Normal Appearance] : normal appearance [No Neck Mass] : no neck mass [Normal Rate/Rhythm] : normal rate/rhythm [Normal S1, S2] : normal s1, s2 [No Murmurs] : no murmurs [No Resp Distress] : no resp distress [No Abnormalities] : no abnormalities [Benign] : benign [Normal Gait] : normal gait [No Clubbing] : no clubbing [No Cyanosis] : no cyanosis [FROM] : FROM [Normal Color/ Pigmentation] : normal color/ pigmentation [No Focal Deficits] : no focal deficits [Oriented x3] : oriented x3 [Normal Affect] : normal affect [1+ Pitting] : 1+ pitting [TextBox_68] : bilateral inspiratory crackles in the mid- lower lung fields

## 2022-07-15 NOTE — HISTORY OF PRESENT ILLNESS
[TextBox_4] : ---67 yo F initially diagnosed with scleroderma back when she was in her 20s.  She has interstitial lung disease with pulmonary hypertension.  She had a RHC in 2008 which did not reveal  elevated PASP with  ? vasodilator responsiveness.  has scleroderma- \par She also has history of significant esophageal involvement.  She underwent esophageal dilatation in 2009 which helped with her recurrent aspiration.  She also has history of sickle cell trait and history of hypothyroidism. Her exercise tolerance has remanned the same.  Medication wise, she is one, levothyroxine, furosemide, lisinopril, Letairis. and nebulizers. ----------She was previously on Ventavis & Revatio. ---NOW ON     Letaris  and Symbicort AND PROMISES TO BE COMPLIANT TO TREATMENT ---- ----\par \par ----PFT------- --DEC 2019-------UNABLE TO DO A COMPLETE PFT-------9/1/15 restrictive lung defect w/bronchodilator response and severely decreased DLCO. ------\par \par PFT  SEPT 2107----EVIDENCE OF RESTRICTIVE ND OBSTRUCTIVE LUNG DEFECT, DECREASED DLCO----\par -PFT SPIROMETRY  2022---SUGGESTIO OF RSTRICTIVE ---- \par - 6 MWT  ---2018----SEPT 228m \par 2016  342   METERS\par 2020   6MWT  ON SUPPLEMENTAL O2     206 METERS \par CT SCAN  CHEST      MARCH 2107--OUTSIDE  FACILITY---FILMS NOT AVAILABLE  FOR REVIEW  ----EMPHYSEMA  WITH BASILAR FIBROSIS IN THE SETTING OF SCLERODERMA---\par \par CT CHEST 12/2020  --[facility]    peripheral and basilar end stage fibrotic , moderate and severe centrilobular emphysema\par ----- She is using 3l/min of oxygen at present and complaint with her medication.--------------\par --\par ------MAR 2018-------is having cough and yellow sputum since 1 week ago----denies any fever \par or chills----is on 2L oxygen-----is on letairis\par \par -july 2019 PAH-------doing well from pulmonary stand pint-----on letairis----tolerating it well------pt states she had a CT scan done at [facility] [NO FILMS AVAILABLE]-----she's on oxygen at 2 lpm x 24 hrs -NNEDS REPAT ECHO\par \par -----DEC  2019-----------doing well from pulmonary stand pint-----on letairis----tolerating it well------pt states she had a CT scan done and ECHO  at [facility] [NO FILMS AVAILABLE]-----she's on oxygen at 2 lpm x 24 hrs------\par \par \par echo 12/2020 ---[facility]---est pasp 36mmHg\par 1/2021 pulm htn- on letairis, she has no current resp complaints, using oxygen at 2 lpm contiuously\par Follows [facility]- gets diagnostic tests done there\par \par 7/2021 pulm htn d/t scleroderma- on letairis and diuretics- on oxygen- stable from resp perspective\par c/o abd pain- following GI - was told she needs a colonoscopy ("stool backed up")\par She has not yet received covid vac\par \par \par \par 11/2021 pulm htn d/t scleroderma- on letairis and diuretics- on oxygen- stable from resp perspective. Getting testing at [facility]. She is up to date w/covid and influenza vac\par \par \par 3/2022  pulm htn d/t scleroderma- on letairis and diuretics- on oxygen @ 2 lpm- stable from resp perspective. STEEN unchanged. Getting testing at [facility]. She is up to date w/covid vac x 2 --needs  echo , ct and  pft\par \par 7/2022: Reports more fatigue since March. Continues to use 3L of O2, reports after exertion, her O2 drops from 99- 93% Continues letarais 10mg daily and 20mg of furosemide. Reports more LE swelling when she feels short of breath.  Reports echo this week at [place] on Monday. Continues to use nebulizer at night during the summer time and as needed during the day.

## 2022-07-18 LAB
ALBUMIN SERPL ELPH-MCNC: 4.1 G/DL
ALP BLD-CCNC: 92 U/L
ALT SERPL-CCNC: 5 U/L
ANION GAP SERPL CALC-SCNC: 9 MMOL/L
AST SERPL-CCNC: 13 U/L
BASOPHILS # BLD AUTO: 0.02 K/UL
BASOPHILS NFR BLD AUTO: 0.5 %
BILIRUB SERPL-MCNC: 0.3 MG/DL
BUN SERPL-MCNC: 13 MG/DL
CALCIUM SERPL-MCNC: 9.5 MG/DL
CHLORIDE SERPL-SCNC: 99 MMOL/L
CO2 SERPL-SCNC: 28 MMOL/L
CREAT SERPL-MCNC: 0.78 MG/DL
EGFR: 84 ML/MIN/1.73M2
EOSINOPHIL # BLD AUTO: 0.17 K/UL
EOSINOPHIL NFR BLD AUTO: 4.1 %
GLUCOSE SERPL-MCNC: 96 MG/DL
HCT VFR BLD CALC: 36.7 %
HGB BLD-MCNC: 10.8 G/DL
IMM GRANULOCYTES NFR BLD AUTO: 0.2 %
LYMPHOCYTES # BLD AUTO: 1.39 K/UL
LYMPHOCYTES NFR BLD AUTO: 33.3 %
MAN DIFF?: NORMAL
MCHC RBC-ENTMCNC: 26.7 PG
MCHC RBC-ENTMCNC: 29.4 GM/DL
MCV RBC AUTO: 90.8 FL
MONOCYTES # BLD AUTO: 0.36 K/UL
MONOCYTES NFR BLD AUTO: 8.6 %
NEUTROPHILS # BLD AUTO: 2.23 K/UL
NEUTROPHILS NFR BLD AUTO: 53.3 %
NT-PROBNP SERPL-MCNC: 35 PG/ML
PLATELET # BLD AUTO: 214 K/UL
POTASSIUM SERPL-SCNC: 4.1 MMOL/L
PROT SERPL-MCNC: 8.3 G/DL
RBC # BLD: 4.04 M/UL
RBC # FLD: 14.6 %
SODIUM SERPL-SCNC: 136 MMOL/L
WBC # FLD AUTO: 4.18 K/UL

## 2022-08-08 ENCOUNTER — INPATIENT (INPATIENT)
Facility: HOSPITAL | Age: 67
LOS: 1 days | Discharge: HOME | End: 2022-08-10
Attending: STUDENT IN AN ORGANIZED HEALTH CARE EDUCATION/TRAINING PROGRAM | Admitting: STUDENT IN AN ORGANIZED HEALTH CARE EDUCATION/TRAINING PROGRAM

## 2022-08-08 VITALS
SYSTOLIC BLOOD PRESSURE: 126 MMHG | OXYGEN SATURATION: 99 % | RESPIRATION RATE: 17 BRPM | TEMPERATURE: 98 F | DIASTOLIC BLOOD PRESSURE: 68 MMHG | HEART RATE: 85 BPM

## 2022-08-08 DIAGNOSIS — Z29.9 ENCOUNTER FOR PROPHYLACTIC MEASURES, UNSPECIFIED: ICD-10-CM

## 2022-08-08 DIAGNOSIS — I27.20 PULMONARY HYPERTENSION, UNSPECIFIED: ICD-10-CM

## 2022-08-08 DIAGNOSIS — K21.9 GASTRO-ESOPHAGEAL REFLUX DISEASE WITHOUT ESOPHAGITIS: ICD-10-CM

## 2022-08-08 DIAGNOSIS — J84.9 INTERSTITIAL PULMONARY DISEASE, UNSPECIFIED: ICD-10-CM

## 2022-08-08 DIAGNOSIS — Z98.890 OTHER SPECIFIED POSTPROCEDURAL STATES: Chronic | ICD-10-CM

## 2022-08-08 DIAGNOSIS — E03.9 HYPOTHYROIDISM, UNSPECIFIED: ICD-10-CM

## 2022-08-08 DIAGNOSIS — K59.00 CONSTIPATION, UNSPECIFIED: ICD-10-CM

## 2022-08-08 DIAGNOSIS — M34.9 SYSTEMIC SCLEROSIS, UNSPECIFIED: ICD-10-CM

## 2022-08-08 LAB
ALBUMIN SERPL ELPH-MCNC: 4.1 G/DL — SIGNIFICANT CHANGE UP (ref 3.3–5)
ALP SERPL-CCNC: 80 U/L — SIGNIFICANT CHANGE UP (ref 40–120)
ALT FLD-CCNC: 7 U/L — SIGNIFICANT CHANGE UP (ref 4–33)
ANION GAP SERPL CALC-SCNC: 8 MMOL/L — SIGNIFICANT CHANGE UP (ref 7–14)
AST SERPL-CCNC: 15 U/L — SIGNIFICANT CHANGE UP (ref 4–32)
B PERT DNA SPEC QL NAA+PROBE: SIGNIFICANT CHANGE UP
B PERT+PARAPERT DNA PNL SPEC NAA+PROBE: SIGNIFICANT CHANGE UP
BASE EXCESS BLDV CALC-SCNC: 7.9 MMOL/L — HIGH (ref -2–3)
BASOPHILS # BLD AUTO: 0.01 K/UL — SIGNIFICANT CHANGE UP (ref 0–0.2)
BASOPHILS NFR BLD AUTO: 0.3 % — SIGNIFICANT CHANGE UP (ref 0–2)
BILIRUB SERPL-MCNC: 0.3 MG/DL — SIGNIFICANT CHANGE UP (ref 0.2–1.2)
BORDETELLA PARAPERTUSSIS (RAPRVP): SIGNIFICANT CHANGE UP
BUN SERPL-MCNC: 10 MG/DL — SIGNIFICANT CHANGE UP (ref 7–23)
C PNEUM DNA SPEC QL NAA+PROBE: SIGNIFICANT CHANGE UP
CA-I SERPL-SCNC: 1.26 MMOL/L — SIGNIFICANT CHANGE UP (ref 1.15–1.33)
CALCIUM SERPL-MCNC: 9.7 MG/DL — SIGNIFICANT CHANGE UP (ref 8.4–10.5)
CHLORIDE BLDV-SCNC: 104 MMOL/L — SIGNIFICANT CHANGE UP (ref 96–108)
CHLORIDE SERPL-SCNC: 98 MMOL/L — SIGNIFICANT CHANGE UP (ref 98–107)
CO2 BLDV-SCNC: 36.9 MMOL/L — HIGH (ref 22–26)
CO2 SERPL-SCNC: 31 MMOL/L — SIGNIFICANT CHANGE UP (ref 22–31)
CREAT SERPL-MCNC: 0.79 MG/DL — SIGNIFICANT CHANGE UP (ref 0.5–1.3)
EGFR: 82 ML/MIN/1.73M2 — SIGNIFICANT CHANGE UP
EOSINOPHIL # BLD AUTO: 0.14 K/UL — SIGNIFICANT CHANGE UP (ref 0–0.5)
EOSINOPHIL NFR BLD AUTO: 4 % — SIGNIFICANT CHANGE UP (ref 0–6)
FLUAV SUBTYP SPEC NAA+PROBE: SIGNIFICANT CHANGE UP
FLUBV RNA SPEC QL NAA+PROBE: SIGNIFICANT CHANGE UP
GAS PNL BLDV: 139 MMOL/L — SIGNIFICANT CHANGE UP (ref 136–145)
GAS PNL BLDV: SIGNIFICANT CHANGE UP
GLUCOSE BLDV-MCNC: 112 MG/DL — HIGH (ref 70–99)
GLUCOSE SERPL-MCNC: 106 MG/DL — HIGH (ref 70–99)
HADV DNA SPEC QL NAA+PROBE: SIGNIFICANT CHANGE UP
HCO3 BLDV-SCNC: 35 MMOL/L — HIGH (ref 22–29)
HCOV 229E RNA SPEC QL NAA+PROBE: SIGNIFICANT CHANGE UP
HCOV HKU1 RNA SPEC QL NAA+PROBE: SIGNIFICANT CHANGE UP
HCOV NL63 RNA SPEC QL NAA+PROBE: SIGNIFICANT CHANGE UP
HCOV OC43 RNA SPEC QL NAA+PROBE: SIGNIFICANT CHANGE UP
HCT VFR BLD CALC: 34.8 % — SIGNIFICANT CHANGE UP (ref 34.5–45)
HCT VFR BLDA CALC: 32 % — LOW (ref 34.5–46.5)
HGB BLD CALC-MCNC: 10.8 G/DL — LOW (ref 11.5–15.5)
HGB BLD-MCNC: 10.5 G/DL — LOW (ref 11.5–15.5)
HMPV RNA SPEC QL NAA+PROBE: SIGNIFICANT CHANGE UP
HPIV1 RNA SPEC QL NAA+PROBE: SIGNIFICANT CHANGE UP
HPIV2 RNA SPEC QL NAA+PROBE: SIGNIFICANT CHANGE UP
HPIV3 RNA SPEC QL NAA+PROBE: SIGNIFICANT CHANGE UP
HPIV4 RNA SPEC QL NAA+PROBE: SIGNIFICANT CHANGE UP
IANC: 1.85 K/UL — SIGNIFICANT CHANGE UP (ref 1.8–7.4)
IMM GRANULOCYTES NFR BLD AUTO: 0.3 % — SIGNIFICANT CHANGE UP (ref 0–1.5)
LACTATE BLDV-MCNC: 2.3 MMOL/L — HIGH (ref 0.5–2)
LYMPHOCYTES # BLD AUTO: 1.1 K/UL — SIGNIFICANT CHANGE UP (ref 1–3.3)
LYMPHOCYTES # BLD AUTO: 31.3 % — SIGNIFICANT CHANGE UP (ref 13–44)
M PNEUMO DNA SPEC QL NAA+PROBE: SIGNIFICANT CHANGE UP
MCHC RBC-ENTMCNC: 27 PG — SIGNIFICANT CHANGE UP (ref 27–34)
MCHC RBC-ENTMCNC: 30.2 GM/DL — LOW (ref 32–36)
MCV RBC AUTO: 89.5 FL — SIGNIFICANT CHANGE UP (ref 80–100)
MONOCYTES # BLD AUTO: 0.4 K/UL — SIGNIFICANT CHANGE UP (ref 0–0.9)
MONOCYTES NFR BLD AUTO: 11.4 % — SIGNIFICANT CHANGE UP (ref 2–14)
NEUTROPHILS # BLD AUTO: 1.85 K/UL — SIGNIFICANT CHANGE UP (ref 1.8–7.4)
NEUTROPHILS NFR BLD AUTO: 52.7 % — SIGNIFICANT CHANGE UP (ref 43–77)
NRBC # BLD: 0 /100 WBCS — SIGNIFICANT CHANGE UP
NRBC # FLD: 0 K/UL — SIGNIFICANT CHANGE UP
NT-PROBNP SERPL-SCNC: 45 PG/ML — SIGNIFICANT CHANGE UP
PCO2 BLDV: 62 MMHG — HIGH (ref 39–42)
PH BLDV: 7.36 — SIGNIFICANT CHANGE UP (ref 7.32–7.43)
PLATELET # BLD AUTO: 199 K/UL — SIGNIFICANT CHANGE UP (ref 150–400)
PO2 BLDV: 31 MMHG — SIGNIFICANT CHANGE UP
POTASSIUM BLDV-SCNC: 4.6 MMOL/L — SIGNIFICANT CHANGE UP (ref 3.5–5.1)
POTASSIUM SERPL-MCNC: 3.9 MMOL/L — SIGNIFICANT CHANGE UP (ref 3.5–5.3)
POTASSIUM SERPL-SCNC: 3.9 MMOL/L — SIGNIFICANT CHANGE UP (ref 3.5–5.3)
PROT SERPL-MCNC: 8.3 G/DL — SIGNIFICANT CHANGE UP (ref 6–8.3)
RAPID RVP RESULT: SIGNIFICANT CHANGE UP
RBC # BLD: 3.89 M/UL — SIGNIFICANT CHANGE UP (ref 3.8–5.2)
RBC # FLD: 14.5 % — SIGNIFICANT CHANGE UP (ref 10.3–14.5)
RSV RNA SPEC QL NAA+PROBE: SIGNIFICANT CHANGE UP
RV+EV RNA SPEC QL NAA+PROBE: SIGNIFICANT CHANGE UP
SAO2 % BLDV: 42.4 % — SIGNIFICANT CHANGE UP
SARS-COV-2 RNA SPEC QL NAA+PROBE: SIGNIFICANT CHANGE UP
SODIUM SERPL-SCNC: 137 MMOL/L — SIGNIFICANT CHANGE UP (ref 135–145)
TROPONIN T, HIGH SENSITIVITY RESULT: <6 NG/L — SIGNIFICANT CHANGE UP
WBC # BLD: 3.51 K/UL — LOW (ref 3.8–10.5)
WBC # FLD AUTO: 3.51 K/UL — LOW (ref 3.8–10.5)

## 2022-08-08 PROCEDURE — 99285 EMERGENCY DEPT VISIT HI MDM: CPT | Mod: 25

## 2022-08-08 PROCEDURE — 71260 CT THORAX DX C+: CPT | Mod: 26

## 2022-08-08 PROCEDURE — 99222 1ST HOSP IP/OBS MODERATE 55: CPT

## 2022-08-08 PROCEDURE — 93010 ELECTROCARDIOGRAM REPORT: CPT

## 2022-08-08 PROCEDURE — 99223 1ST HOSP IP/OBS HIGH 75: CPT | Mod: GC

## 2022-08-08 PROCEDURE — 71045 X-RAY EXAM CHEST 1 VIEW: CPT | Mod: 26

## 2022-08-08 RX ORDER — AMBRISENTAN 10 MG/1
1 TABLET, FILM COATED ORAL
Qty: 0 | Refills: 0 | DISCHARGE

## 2022-08-08 RX ORDER — PANTOPRAZOLE SODIUM 20 MG/1
40 TABLET, DELAYED RELEASE ORAL
Refills: 0 | Status: DISCONTINUED | OUTPATIENT
Start: 2022-08-08 | End: 2022-08-10

## 2022-08-08 RX ORDER — SENNA PLUS 8.6 MG/1
1 TABLET ORAL
Qty: 0 | Refills: 0 | DISCHARGE

## 2022-08-08 RX ORDER — FUROSEMIDE 40 MG
20 TABLET ORAL ONCE
Refills: 0 | Status: COMPLETED | OUTPATIENT
Start: 2022-08-08 | End: 2022-08-08

## 2022-08-08 RX ORDER — AMBRISENTAN 10 MG/1
10 TABLET, FILM COATED ORAL DAILY
Refills: 0 | Status: DISCONTINUED | OUTPATIENT
Start: 2022-08-08 | End: 2022-08-10

## 2022-08-08 RX ORDER — LISINOPRIL 2.5 MG/1
1 TABLET ORAL
Qty: 0 | Refills: 0 | DISCHARGE

## 2022-08-08 RX ORDER — LACTULOSE 10 G/15ML
15 SOLUTION ORAL
Qty: 0 | Refills: 0 | DISCHARGE

## 2022-08-08 RX ORDER — POLYETHYLENE GLYCOL 3350 17 G/17G
17 POWDER, FOR SOLUTION ORAL DAILY
Refills: 0 | Status: DISCONTINUED | OUTPATIENT
Start: 2022-08-08 | End: 2022-08-10

## 2022-08-08 RX ORDER — FUROSEMIDE 40 MG
40 TABLET ORAL
Refills: 0 | Status: DISCONTINUED | OUTPATIENT
Start: 2022-08-09 | End: 2022-08-10

## 2022-08-08 RX ORDER — FUROSEMIDE 40 MG
20 TABLET ORAL
Refills: 0 | Status: DISCONTINUED | OUTPATIENT
Start: 2022-08-10 | End: 2022-08-10

## 2022-08-08 RX ORDER — LEVOTHYROXINE SODIUM 125 MCG
1 TABLET ORAL
Qty: 0 | Refills: 0 | DISCHARGE

## 2022-08-08 RX ORDER — SENNA PLUS 8.6 MG/1
2 TABLET ORAL AT BEDTIME
Refills: 0 | Status: DISCONTINUED | OUTPATIENT
Start: 2022-08-08 | End: 2022-08-10

## 2022-08-08 RX ORDER — LACTULOSE 10 G/15ML
10 SOLUTION ORAL DAILY
Refills: 0 | Status: DISCONTINUED | OUTPATIENT
Start: 2022-08-08 | End: 2022-08-10

## 2022-08-08 RX ORDER — FUROSEMIDE 40 MG
20 TABLET ORAL DAILY
Refills: 0 | Status: DISCONTINUED | OUTPATIENT
Start: 2022-08-08 | End: 2022-08-08

## 2022-08-08 RX ORDER — AMBRISENTAN 10 MG/1
10 TABLET, FILM COATED ORAL DAILY
Refills: 0 | Status: DISCONTINUED | OUTPATIENT
Start: 2022-08-08 | End: 2022-08-08

## 2022-08-08 RX ORDER — LEVOTHYROXINE SODIUM 125 MCG
25 TABLET ORAL DAILY
Refills: 0 | Status: DISCONTINUED | OUTPATIENT
Start: 2022-08-08 | End: 2022-08-10

## 2022-08-08 RX ORDER — OMEPRAZOLE 10 MG/1
40 CAPSULE, DELAYED RELEASE ORAL
Qty: 0 | Refills: 0 | DISCHARGE

## 2022-08-08 RX ORDER — ENOXAPARIN SODIUM 100 MG/ML
40 INJECTION SUBCUTANEOUS EVERY 24 HOURS
Refills: 0 | Status: DISCONTINUED | OUTPATIENT
Start: 2022-08-08 | End: 2022-08-10

## 2022-08-08 RX ADMIN — AMBRISENTAN 10 MILLIGRAM(S): 10 TABLET, FILM COATED ORAL at 19:34

## 2022-08-08 RX ADMIN — Medication 20 MILLIGRAM(S): at 19:34

## 2022-08-08 NOTE — H&P ADULT - NSHPREVIEWOFSYSTEMS_GEN_ALL_CORE
General: Denies dizziness, fatigue  Eyes: Denies blurry vision  ENMT: Denies rhinorrhea  Respiratory: + SOB, Denies cough  Cardiovascular: Denies palpitations, CP  Gastrointestinal: +Abdominal pain + Constipation  Denies N/V/D/C, hematochezia, melena  : Denies dysuria, increased freq  Musculoskeletal: Denies edema, joint pain  Endocrine: Denies increased thirst, increased frequency  Allergic/Immunologic: Denies rashes or hives  Neuro: Denies weakness, numbness  Psych: Denies anxiety, depression  All ROS negative unless indicated above

## 2022-08-08 NOTE — ED PROVIDER NOTE - CLINICAL SUMMARY MEDICAL DECISION MAKING FREE TEXT BOX
66 year old F with PMH scleroderma with positive Sjogrens antibodies, interstitial lung disease (on 2.5L home oxygen) and pulmonary hypertension referred from pulmonology for admission. VSS, afebrile, non toxic appearing. Patient satting 100% on home oxygen (2.5L NC). Bilateral crackles to mid lung. EKG without signs of ischemia or infarct. No fevers to suggest infectious etiology of dyspnea. Suspect symptoms 2/2 to known ILD. Will contact Dr. Perez. Basic labs, troponin, bnp, CXR. Admit.

## 2022-08-08 NOTE — ED PROVIDER NOTE - ATTENDING CONTRIBUTION TO CARE
I (Jeremy) agree with above, I performed a history and physical. Counseled marti medical staff, physician assistant, and/or medical student on medical decision making as documented. Medical decisions and treatment interventions were made in real time during the patient encounter. Additionally and/or with the following exceptions: The patient presented to the ED as a referral for admission by her private pulmonologist for admission due to worsening of her interstitial lung disease. Is on her baseline 2L nc O2 but has had worsening dyspnea on exertion. complete blood count within normal limits, complete metabolic panel within normal limits, co2 elevated suggesting hypoventilation. Admitted for exacerbation of lung disease. The patient's condition was not amenable to outpatient treatment due either the lack of feasibility of outpatient care coordination, possibility for further decompensation with adverse outcome if discharge, or treatments and diagnostic  modalities only available during an inpatient hospitalization.

## 2022-08-08 NOTE — H&P ADULT - PROBLEM SELECTOR PLAN 7
DVT ppx: SQH  GI ppx: home omeprazole  Disposition: Likely home, with home O2 DVT ppx: Lovenox  GI ppx: home omeprazole  Disposition: Likely home, with home O2 DVT ppx: Lovenox  GI ppx: home ppi  Disposition: Likely home, with home O2

## 2022-08-08 NOTE — CONSULT NOTE ADULT - SUBJECTIVE AND OBJECTIVE BOX
CHIEF COMPLAINT: shortness of breath    HPI:  65 yo F with PMH scleroderma, ILD (on 2-3 L home O2), and pulmonary hypertension who presents with shortness of breath. She reports that she was sent in by her pulmonologist Dr. Perez. She last saw Dr. Perez in 7/2022 at which time she was continued on Letairis, lasix. She has had no recent increase in her supplemental O2 requirement, and she uses her supplemental O2 at all times. No chest pain, chest tightness, cough, fever, chills. Exercise tolerance of 5L, reports that she has had dyspnea with exertion for many years. Never smoker. Her lasix was recently increased from alternating 20mg/40mg to 40mg every day. She reports compliance with lasix as well as with Letairis. She was most recently hospitalized at [facility] for shortness of breath but is unsure of the workup that was completed. No sick contacts, no recent travel.    Pulmonary consulted for pHTN.      PAST MEDICAL & SURGICAL HISTORY:  ILD  Scleroderma  pHTN    FAMILY HISTORY:  unknown    SOCIAL HISTORY:  Smoking: [x] Never Smoked [ ] Former Smoker (__ packs x ___ years) [ ] Current Smoker  (__ packs x ___ years)  Substance Use: [ ] Never Used [ ] Used ____  EtOH Use:  Marital Status: [ ] Single [ ]  [ ]  [ ]   Sexual History:   Occupation:  Recent Travel:  Country of Birth:  Advance Directives:    Allergies    No Known Allergies    Intolerances        HOME MEDICATIONS:  Home Medications:      REVIEW OF SYSTEMS:  All systems negative except as documented above.    OBJECTIVE:  ICU Vital Signs Last 24 Hrs  T(C): 36.7 (08 Aug 2022 14:17), Max: 36.7 (08 Aug 2022 14:17)  T(F): 98 (08 Aug 2022 14:17), Max: 98 (08 Aug 2022 14:17)  HR: 62 (08 Aug 2022 14:17) (62 - 85)  BP: 112/76 (08 Aug 2022 14:17) (104/80 - 126/68)  BP(mean): --  ABP: --  ABP(mean): --  RR: 17 (08 Aug 2022 14:17) (17 - 18)  SpO2: 98% (08 Aug 2022 14:17) (98% - 99%)    O2 Parameters below as of 08 Aug 2022 14:17  Patient On (Oxygen Delivery Method): nasal cannula  O2 Flow (L/min): 2            CAPILLARY BLOOD GLUCOSE          PHYSICAL EXAM:  General: NAD  HEENT: PERRL, EOMI, sclera anicteric, moist mucus membranes  Neck: supple, no JVD  Cardiovascular: RRR, no murmurs, rubs, gallops  Respiratory: b/l crackles, no wheezes, crackles, or rhonci  Abdomen: soft, nontender, nondistended, normoactive bowel sounds  Extremities: warm and well perfused, trace edema, no clubbing  Skin: no rashes  Neurological: AOx3, no focal deficits    HOSPITAL MEDICATIONS:  Standing Meds:      PRN Meds:      LABS:                        10.5   3.51  )-----------( 199      ( 08 Aug 2022 11:40 )             34.8     Hgb Trend: 10.5<--  08-08    137  |  98  |  10  ----------------------------<  106<H>  3.9   |  31  |  0.79    Ca    9.7      08 Aug 2022 11:40    TPro  8.3  /  Alb  4.1  /  TBili  0.3  /  DBili  x   /  AST  15  /  ALT  7   /  AlkPhos  80  08-08    Creatinine Trend: 0.79<--        Venous Blood Gas:  08-08 @ 11:40  7.36/62/31/35/42.4  VBG Lactate: 2.3      MICROBIOLOGY:       RADIOLOGY:  [x] Reviewed and interpreted by me     CHIEF COMPLAINT: shortness of breath    HPI:  65 yo F with PMH scleroderma, ILD (on 2-3 L home O2), and pulmonary hypertension who presents with shortness of breath. She reports that she was sent in by her pulmonologist Dr. Perez. She last saw Dr. Perez in 7/2022 at which time she was continued on Letairis, lasix. She has had no recent increase in her supplemental O2 requirement, and she uses her supplemental O2 at all times. No chest pain, chest tightness, cough, fever, chills. Exercise tolerance of 5L, reports that she has had dyspnea with exertion for many years. Never smoker. Her lasix was recently increased from alternating 20mg/40mg to 40mg every day. She reports compliance with lasix as well as with Letairis. She was most recently hospitalized at [facility] for shortness of breath but is unsure of the workup that was completed. No sick contacts, no recent travel.    Pulmonary consulted for pHTN.      PAST MEDICAL & SURGICAL HISTORY:  ILD  Scleroderma  pHTN    FAMILY HISTORY:  No history of ILD    SOCIAL HISTORY:  Smoking: Former tobacco use > 40 years ago. Quit ~30 years ago.  Substance Use:  [x] Never Used [ ] Used ____  EtOH Use: Denies  Marital Status: [ ] Single [ ]  [ ]  [ ]   Sexual History:   Occupation:  Recent Travel:  Country of Birth:  Advance Directives:    Allergies    No Known Allergies    Intolerances        HOME MEDICATIONS:  furosemide 20 mg oral tablet: 20 milligram(s) orally every other day (08 Aug 2022 17:07)  furosemide 40 mg oral tablet: 40 milligram(s) orally every other day (08 Aug 2022 17:07)  lactulose 10 g/15 mL oral solution: 15 milliliter(s) orally once a day (08 Aug 2022 17:07)  Letairis 10 mg oral tablet: 1 tab(s) orally once a day (08 Aug 2022 17:07)  levothyroxine 25 mcg (0.025 mg) oral tablet: 1 tab(s) orally once a day (08 Aug 2022 17:07)  lisinopril 10 mg oral tablet: 1 tab(s) orally once a day (08 Aug 2022 17:07)  omeprazole 40 mg oral delayed release capsule: 40 milligram(s) orally once a day, As Needed (08 Aug 2022 17:07)  Senna 8.6 mg oral tablet: 1 tab(s) orally once a day (at bedtime) (08 Aug 2022 17:07)      REVIEW OF SYSTEMS:  All systems negative except as documented above.    OBJECTIVE:  ICU Vital Signs Last 24 Hrs  T(C): 36.7 (08 Aug 2022 14:17), Max: 36.7 (08 Aug 2022 14:17)  T(F): 98 (08 Aug 2022 14:17), Max: 98 (08 Aug 2022 14:17)  HR: 62 (08 Aug 2022 14:17) (62 - 85)  BP: 112/76 (08 Aug 2022 14:17) (104/80 - 126/68)  BP(mean): --  ABP: --  ABP(mean): --  RR: 17 (08 Aug 2022 14:17) (17 - 18)  SpO2: 98% (08 Aug 2022 14:17) (98% - 99%)    O2 Parameters below as of 08 Aug 2022 14:17  Patient On (Oxygen Delivery Method): nasal cannula  O2 Flow (L/min): 2            CAPILLARY BLOOD GLUCOSE          PHYSICAL EXAM:  General: NAD  HEENT: PERRL, EOMI, sclera anicteric, moist mucus membranes  Neck: supple, no JVD  Cardiovascular: RRR, no murmurs, rubs, gallops  Respiratory: b/l crackles, no wheezes, crackles, or rhonci  Abdomen: soft, nontender, nondistended, normoactive bowel sounds  Extremities: warm and well perfused, trace edema, no clubbing  Skin: no rashes  Neurological: AOx3, no focal deficits    HOSPITAL MEDICATIONS:  Standing Meds:      PRN Meds:      LABS:                        10.5   3.51  )-----------( 199      ( 08 Aug 2022 11:40 )             34.8     Hgb Trend: 10.5<--  08-08    137  |  98  |  10  ----------------------------<  106<H>  3.9   |  31  |  0.79    Ca    9.7      08 Aug 2022 11:40    TPro  8.3  /  Alb  4.1  /  TBili  0.3  /  DBili  x   /  AST  15  /  ALT  7   /  AlkPhos  80  08-08    Creatinine Trend: 0.79<--        Venous Blood Gas:  08-08 @ 11:40  7.36/62/31/35/42.4  VBG Lactate: 2.3      MICROBIOLOGY:       RADIOLOGY:  [x] Reviewed and interpreted by me    CT Chest 8/8/22  Extensive pulmonary cystic changes, reticular opacities  Esophageal dilation  Similar in appearance to prior imaging

## 2022-08-08 NOTE — H&P ADULT - NSICDXPASTMEDICALHX_GEN_ALL_CORE_FT
PAST MEDICAL HISTORY:  GERD (gastroesophageal reflux disease)     Hypothyroidism     Interstitial lung disease     Pulmonary hypertension     Scleroderma

## 2022-08-08 NOTE — ED ADULT NURSE NOTE - PRO INTERPRETER NEED 2
Patient states she has a rash she thought was fever blisters on her chin where the BCCa was removed. Patient advised margins clear and she needs to see dermatologist.   
English

## 2022-08-08 NOTE — H&P ADULT - ASSESSMENT
66F history of scleroderma, ILD (on 2.5 - 3L home O2), pulmonary hypertension, hypothyroidism, GERD with progressively worsening dyspnea on exertion over last month. Sent in by pulmonologist Dr. Perez for further workup.

## 2022-08-08 NOTE — H&P ADULT - ATTENDING COMMENTS
66 year old female with a history of scleroderma, ILD (on 2.5 - 3L home O2), pulmonary hypertension, hypothyroidism and GERD presenting with worsening shortness of breath over the past few months. Patient now feels short of breath after walking several minutes. Patient saw her pulmonologist Dr. Perez last month who wanted patient to come to the hospital for further evaluation. Patient denies cough, fevers, chills or lower extremity edema. She reports constipation with her last BM being 4 days ago.    #Shortness of breath, ILD  -CT chest performed, no acute changes noted but slightly worsening appearance of reticular opacities in CRISELDA  -pulmonary consulted, will f/u recs  -low suspicion for acute infection, will monitor off abx  -TTE ordered to evaluate pulmonary HTN  -c/w home meds Lasix alternating 20mg/40mg every other day, Letairis  -c/w supplemental O2, currently at baseline requirements    #Constipation  -Dulcolax suppository now  -if no BM after suppository will give enema  -c/w home lactulose and senna, add Miralax    d/w HS 5

## 2022-08-08 NOTE — PATIENT PROFILE ADULT - FALL HARM RISK - HARM RISK INTERVENTIONS

## 2022-08-08 NOTE — H&P ADULT - NSHPPHYSICALEXAM_GEN_ALL_CORE
PHYSICAL EXAM:  GENERAL: NAD, lying in bed comfortably  HEAD:  Atraumatic, Normocephalic  EYES: EOMI, PERRLA, conjunctiva and sclera clear  ENT: Moist mucous membranes, NC in place  NECK: Supple, No JVD  CHEST/LUNG: Crackles diffusely Unlabored respirations on 2.5L O2.   HEART: Regular rate and rhythm; No murmurs, rubs, or gallops  ABDOMEN: Bowel sounds present; Soft, mildly tender in LUQ. Nondistended. No hepatomegaly  EXTREMITIES:  2+ Peripheral Pulses, brisk capillary refill. No clubbing, cyanosis, or edema  NERVOUS SYSTEM:  Alert & Oriented X3, speech clear. No deficits   MSK: FROM all 4 extremities, full and equal strength  SKIN: No rashes or lesions

## 2022-08-08 NOTE — H&P ADULT - PROBLEM SELECTOR PLAN 3
History of pulmonary hypertension 2/2 ILD. ILD 2/2 scleroderma.   - pulmonary hypertension management as above.

## 2022-08-08 NOTE — ED ADULT NURSE NOTE - CHIEF COMPLAINT QUOTE
pt with pulmonary htn, c/o increasing SOB. wears 2.5L NC at home. sent in by her pulmonologist for admission

## 2022-08-08 NOTE — ED PROVIDER NOTE - PROGRESS NOTE DETAILS
Darline Martinez PGY-3: Left page for Dr. Perez. Darline Martinez PGY-3: Spoke with Dr. Perez. Would like patient to be admitted for CT and echo. To call pulmonary team.

## 2022-08-08 NOTE — ED PROVIDER NOTE - PHYSICAL EXAMINATION
GENERAL: Awake, alert, NAD  HEENT: NC/AT, moist mucous membranes, PERRL, EOMI  LUNGS: Tachypneic, on 2.5L NC (home oxygen), bilateral crackles to mid-lung  CARDIAC: RRR, no m/r/g  ABDOMEN: Soft, normal BS, non tender, non distended, no rebound, no guarding  BACK: No midline spinal tenderness, no CVA tenderness  EXT: No edema, no calf tenderness, 2+ DP pulses bilaterally, no deformities.  NEURO: A&Ox3. Moving all extremities.  SKIN: Warm and dry. No rash.  PSYCH: Normal affect.

## 2022-08-08 NOTE — ED PROVIDER NOTE - OBJECTIVE STATEMENT
66 year old F with PMH scleroderma with positive Sjogrens antibodies, interstitial lung disease (on 2.5L home oxygen) and pulmonary hypertension referred from pulmonology for admission. Patient states she has declined over the past year. Has STEEN, uses home oxygen and has nebulizer and inhalers. Presented to pulmonologist's office (Dr. Perez) today for follow up appointment. Had recent echo and CT scan at [facility], but unsure of results. Denies fevers/chills, URI symptoms, LE edema, chest pain, palpitations, abdominal pain, urinary symptoms, dark or bloody stools.

## 2022-08-08 NOTE — H&P ADULT - NSHPLABSRESULTS_GEN_ALL_CORE
.  LABS:                         10.5   3.51  )-----------( 199      ( 08 Aug 2022 11:40 )             34.8     08-08    137  |  98  |  10  ----------------------------<  106<H>  3.9   |  31  |  0.79    Ca    9.7      08 Aug 2022 11:40    TPro  8.3  /  Alb  4.1  /  TBili  0.3  /  DBili  x   /  AST  15  /  ALT  7   /  AlkPhos  80  08-08        Serum Pro-Brain Natriuretic Peptide: 45 pg/mL (08-08 @ 11:40)        RADIOLOGY, EKG & ADDITIONAL TESTS: Reviewed.     PROCEDURE:  CT scan of the chest was obtained without intravenous contrast.    FINDINGS:    LYMPH NODES: No lymphadenopathy. Small calcified mediastinal lymph nodes.    HEART/VASCULATURE: Aortic calcifications. Mild cardiomegaly.    AIRWAYS/LUNGS/PLEURA: Central airways are clear. Extensive cystic changes   throughout the lungs involving all lobes. Overall, this is similar in   appearance to comparison study dated 12/19/2008 with the exception of   mild increased reticular opacities in the left upper lobe towards the   apex. Superimposed emphysematous changes. Right lower lobe peripheral   pleural calcifications with associated pleural thickening, not   significantly changed since 2012. There is a 6 mm nodule in the lingula   (2:70), new since 2008 study and mildly increased in size since 2012   study.    UPPER ABDOMEN: Esophagus is dilated and contains layering fluid within   the midportion. Punctate peripheral hepatic calcification. There is a 1.6   cm right adrenal adenoma, unchanged since 2008.    BONES/SOFT TISSUES: Osseous degenerative changes. T3 hemangioma.   Enlarged, heterogeneous, and multinodular thyroid with several   calcifications, unchanged in size since 2008.    IMPRESSION:    Extensive pulmonary cystic changes, with a lower lobe predominance. These   findings are not significantly changed since the 2008 comparison study   with the exception of mildly increased reticular opacities in the left   upper lobe towards the apex which is nonspecific and may represent   infectious, inflammatory, or changes secondary to underlying ILD.    Similar-appearing esophageal dilation with layering fluid. These findings   are compatible with patient known history of scleroderma.    There is a 6 mm lingular nodule, slightly increased in size since 2012   study.    Recommend three-month follow-up noncontrast CT chest for further   evaluation of the above findings or submission of most recent outside   comparison chest CT for comparative purposes. If more recent chest CTs   are made available, an addendum can be issued. LABS:                         10.5   3.51  )-----------( 199      ( 08 Aug 2022 11:40 )             34.8     08-08    137  |  98  |  10  ----------------------------<  106<H>  3.9   |  31  |  0.79    Ca    9.7      08 Aug 2022 11:40    TPro  8.3  /  Alb  4.1  /  TBili  0.3  /  DBili  x   /  AST  15  /  ALT  7   /  AlkPhos  80  08-08      Serum Pro-Brain Natriuretic Peptide: 45 pg/mL (08-08 @ 11:40)      RADIOLOGY, EKG & ADDITIONAL TESTS: Reviewed.     PROCEDURE:  CT scan of the chest was obtained without intravenous contrast.    FINDINGS:    LYMPH NODES: No lymphadenopathy. Small calcified mediastinal lymph nodes.    HEART/VASCULATURE: Aortic calcifications. Mild cardiomegaly.    AIRWAYS/LUNGS/PLEURA: Central airways are clear. Extensive cystic changes   throughout the lungs involving all lobes. Overall, this is similar in   appearance to comparison study dated 12/19/2008 with the exception of   mild increased reticular opacities in the left upper lobe towards the   apex. Superimposed emphysematous changes. Right lower lobe peripheral   pleural calcifications with associated pleural thickening, not   significantly changed since 2012. There is a 6 mm nodule in the lingula   (2:70), new since 2008 study and mildly increased in size since 2012   study.    UPPER ABDOMEN: Esophagus is dilated and contains layering fluid within   the midportion. Punctate peripheral hepatic calcification. There is a 1.6   cm right adrenal adenoma, unchanged since 2008.    BONES/SOFT TISSUES: Osseous degenerative changes. T3 hemangioma.   Enlarged, heterogeneous, and multinodular thyroid with several   calcifications, unchanged in size since 2008.    IMPRESSION:    Extensive pulmonary cystic changes, with a lower lobe predominance. These   findings are not significantly changed since the 2008 comparison study   with the exception of mildly increased reticular opacities in the left   upper lobe towards the apex which is nonspecific and may represent   infectious, inflammatory, or changes secondary to underlying ILD.    Similar-appearing esophageal dilation with layering fluid. These findings   are compatible with patient known history of scleroderma.    There is a 6 mm lingular nodule, slightly increased in size since 2012   study.    Recommend three-month follow-up noncontrast CT chest for further   evaluation of the above findings or submission of most recent outside   comparison chest CT for comparative purposes. If more recent chest CTs   are made available, an addendum can be issued.    Chest x-ray personally reviewed: B/l reticular opacities

## 2022-08-08 NOTE — H&P ADULT - PROBLEM SELECTOR PLAN 2
History of scleroderma from Age 20s c/b ILD. Additionally, s/p esophageal dilation in 2008 to reduce symptoms of reflux and aspiration.  - ILD management as above.

## 2022-08-08 NOTE — ED PROVIDER NOTE - NS ED ROS FT
INTERVAL HPI/OVERNIGHT EVENTS: Afebrile, off antibiotics since     CONSTITUTIONAL:  Negative fever or chills, feels well, good appetite  EYES:  Negative  blurry vision or double vision  CARDIOVASCULAR:  Negative for chest pain or palpitations  RESPIRATORY:  Negative for cough, wheezing, or SOB   GASTROINTESTINAL:  Negative for nausea, vomiting, diarrhea, constipation, or abdominal pain  GENITOURINARY:  Negative frequency, urgency or dysuria  NEUROLOGIC:  No headache, confusion, dizziness, lightheadedness      ANTIBIOTICS/RELEVANT:    MEDICATIONS  (STANDING):  amiodarone    Tablet 200milliGRAM(s) Oral daily  insulin lispro (HumaLOG) corrective regimen sliding scale  SubCutaneous Before meals and at bedtime  atorvastatin 40milliGRAM(s) Oral at bedtime  bisacodyl Suppository 10milliGRAM(s) Rectal daily  lidocaine   Patch 1Patch Transdermal daily  metoprolol 12.5milliGRAM(s) Oral two times a day  apixaban 2.5milliGRAM(s) Oral two times a day  furosemide    Tablet 20milliGRAM(s) Oral daily  aspirin enteric coated 81milliGRAM(s) Oral daily  cyanocobalamin Injectable 1000MICROGram(s) SubCutaneous daily  megestrol Suspension 625milliGRAM(s) Oral daily  oxyCODONE Solution 5milliGRAM(s) Oral every 4 hours    MEDICATIONS  (PRN):  acetaminophen  Suppository 650milliGRAM(s) Rectal every 6 hours PRN For Temp greater than 38 C (100.4 F)  guaiFENesin    Syrup 200milliGRAM(s) Oral every 6 hours PRN Cough  meperidine     Injectable 25milliGRAM(s) IV Push every 6 hours PRN Rigors  morphine  - Injectable 0.5milliGRAM(s) IV Push every 4 hours PRN Dyspnea / Air hunger / Increased work of breathing / RR>25 / moderate pain (4-6)  morphine  - Injectable 1milliGRAM(s) IV Push every 4 hours PRN Excessive work of breathing / RR>35 / Resp distress / severe pain (7-10)        Vital Signs Last 24 Hrs  T(C): 36.8, Max: 37.4 (- @ 06:14)  T(F): 98.3, Max: 99.3 (- @ 06:14)  HR: 99 (91 - 99)  BP: 115/63 (107/54 - 118/66)  BP(mean): --  RR: 17 (15 - 17)  SpO2: 94% (94% - 98%)    PHYSICAL EXAM:  Constitutional:  Cachectic, elderly female  Eyes:ESSENCE, EOMI  Ear/Nose/Throat: no oral lesion, no sinus tenderness on percussion	  Neck:no JVD, no lymphadenopathy, supple  Respiratory: CTA luz elena  Cardiovascular: S1S2 RRR, no murmurs  Gastrointestinal:soft, (+) BS, no HSM  Extremities:no e/e/c        LABS:                        8.4    2.3   )-----------( 85       ( 2017 06:08 )             26.2     06-24    138  |  98  |  26<H>  ----------------------------<  103<H>  4.0   |  31  |  0.80    Ca    8.5      2017 06:08  Phos  2.7     -  Mg     1.8     -24        Urinalysis Basic - ( 2017 18:36 )    Color: Yellow / Appearance: Turbid / S.020 / pH: x  Gluc: x / Ketone: NEGATIVE  / Bili: NEGATIVE / Urobili: 0.2 E.U./dL   Blood: x / Protein: 30 mg/dL / Nitrite: NEGATIVE   Leuk Esterase: NEGATIVE / RBC: 5-10 /HPF / WBC < 5 /HPF   Sq Epi: x / Non Sq Epi: Moderate /HPF / Bacteria: Present /HPF        MICROBIOLOGY:  Culture - Blood (17 @ 14:10)    Specimen Source: .Blood Blood-Venous    Culture Results:   No growth at 1 day.      Culture - Blood (17 @ 20:12)    Specimen Source: .Blood Blood-Peripheral    Culture Results:   No growth at 3 days.      RADIOLOGY & ADDITIONAL STUDIES: CONST: no fevers, no chills  EYES: no pain, no vision changes  ENT: no sore throat, no ear pain, no change in hearing  CV: no chest pain, no leg swelling  RESP: + shortness of breath, no cough  ABD: no abdominal pain, no nausea, no vomiting, no diarrhea  : no dysuria, no flank pain, no hematuria  MSK: no back pain, no extremity pain  NEURO: no headache or additional neurologic complaints  HEME: no easy bleeding  SKIN:  no rash

## 2022-08-08 NOTE — H&P ADULT - PROBLEM SELECTOR PLAN 1
Long standing history of ILD likely 2/2 scleroderma c/b pulmonary hypertension. PFT 2015 with restrictive lung defect. On letairis, Follows Dr. Perez. Long standing history of ILD likely 2/2 scleroderma c/b pulmonary hypertension. On home O2 2.5L. PFT 2015 with restrictive lung defect. On letairis, lasix, lisinopril. VBG this admission with chronic respiratory acidosis. Bicarb was lower in March 2022.   - Pulmonary consulted, appreciate recs  - home letairis 10 mg PO qD  - home Lasix 20 mg qD  - home lisinopril 10 mg PO qD  - CT chest w/ changes consistent with ILD, 6mm lingular nodule  - pending Echo Follows Dr. Perez. Long standing history of ILD likely 2/2 scleroderma c/b pulmonary hypertension. On home O2 2.5L. PFT 2015 with restrictive lung defect. On Letairis, Lasix, lisinopril. VBG this admission with chronic respiratory acidosis. Bicarb was lower in March 2022.   - Pulmonary consulted, appreciate recs  - home Letairis 10 mg PO qD  - home Lasix 20 mg qD  - home lisinopril 10 mg PO qD  - CT chest w/ changes consistent with ILD, 6mm lingular nodule  - pending Echo

## 2022-08-08 NOTE — H&P ADULT - PROBLEM SELECTOR PLAN 6
Chronic constipation. Has been prescribed lactulose and senna chronically without significant relief. Last bowel movement was 4 days prior to admission.  - home lactulose  - home senna Chronic constipation. Has been prescribed lactulose and senna chronically without significant relief. Last bowel movement was 4 days prior to admission.  - home lactulose  - home senna  - miralax  - dulcolax suppository

## 2022-08-08 NOTE — PATIENT PROFILE ADULT - FALL HARM RISK - TYPE OF ASSESSMENT
Discussed the importance of blood sugar and blood pressure control and keeping the HA1C < 6.5%. Admission

## 2022-08-08 NOTE — H&P ADULT - HISTORY OF PRESENT ILLNESS
66F history of scleroderma, ILD (on 2.5 - 3L home O2), pulmonary hypertension, hypothyroidism, GERD. She was sent in by her pulmonologist Dr. Perez for further workup of her shortness of breath. She has been on O2 for > 20 year, uses it 24/7. She has noticed worsening dyspnea on exertion, in May 2022 was at her baseline and able to walk 10 minutes without rest. Over the last mon 66F history of scleroderma, ILD (on 2.5 - 3L home O2), pulmonary hypertension, hypothyroidism, GERD. She was sent in by her pulmonologist Dr. Perez for further workup of her shortness of breath. She has been on O2 for > 20 year, uses it 24/7. She has noticed worsening dyspnea on exertion. May 2022 she was at her baseline and able to walk 10 minutes without rest. Over the last month has had to rest every 5 minutes. She saw Dr. Perez for follow up on 7/15/22. Her lasix was increased from 20 mg daily to 20mg and 40 mg every other day. She was also asked to obtain a Echo and CT chest for further work up. She initially presented to [facility] ED and received an echo which was never provided to Dr. Perez which is why she presented to [facility] today.    She does not recall ever seeing a cardiologist. Has had some weight gain but no leg swelling recently. No chest pain, palpitations, or dizziness. Denies fever, chills, cough, or sick contacts.

## 2022-08-08 NOTE — CONSULT NOTE ADULT - ASSESSMENT
67 yo F with PMH scleroderma, ILD (on 2-3 L home O2), and pulmonary hypertension who presents with shortness of breath, possible differential includes progression of ILD vs pHTN.    Recommendations:  - continue maintenance lasix dose  - continue Letairis  - please obtain CT chest to evaluate ILD  - please obtain TTE  - will review above imaging to determine next steps in work up      Pulmonary to follow.

## 2022-08-09 LAB
ALBUMIN SERPL ELPH-MCNC: 3.9 G/DL — SIGNIFICANT CHANGE UP (ref 3.3–5)
ALP SERPL-CCNC: 75 U/L — SIGNIFICANT CHANGE UP (ref 40–120)
ALT FLD-CCNC: 6 U/L — SIGNIFICANT CHANGE UP (ref 4–33)
ANION GAP SERPL CALC-SCNC: 13 MMOL/L — SIGNIFICANT CHANGE UP (ref 7–14)
AST SERPL-CCNC: 15 U/L — SIGNIFICANT CHANGE UP (ref 4–32)
BILIRUB SERPL-MCNC: 0.4 MG/DL — SIGNIFICANT CHANGE UP (ref 0.2–1.2)
BUN SERPL-MCNC: 11 MG/DL — SIGNIFICANT CHANGE UP (ref 7–23)
CALCIUM SERPL-MCNC: 9.4 MG/DL — SIGNIFICANT CHANGE UP (ref 8.4–10.5)
CHLORIDE SERPL-SCNC: 98 MMOL/L — SIGNIFICANT CHANGE UP (ref 98–107)
CO2 SERPL-SCNC: 28 MMOL/L — SIGNIFICANT CHANGE UP (ref 22–31)
CREAT SERPL-MCNC: 0.76 MG/DL — SIGNIFICANT CHANGE UP (ref 0.5–1.3)
EGFR: 86 ML/MIN/1.73M2 — SIGNIFICANT CHANGE UP
GLUCOSE SERPL-MCNC: 95 MG/DL — SIGNIFICANT CHANGE UP (ref 70–99)
HCT VFR BLD CALC: 33.7 % — LOW (ref 34.5–45)
HCV AB S/CO SERPL IA: 0.09 S/CO — SIGNIFICANT CHANGE UP (ref 0–0.99)
HCV AB SERPL-IMP: SIGNIFICANT CHANGE UP
HGB BLD-MCNC: 10.6 G/DL — LOW (ref 11.5–15.5)
MAGNESIUM SERPL-MCNC: 1.8 MG/DL — SIGNIFICANT CHANGE UP (ref 1.6–2.6)
MCHC RBC-ENTMCNC: 27.4 PG — SIGNIFICANT CHANGE UP (ref 27–34)
MCHC RBC-ENTMCNC: 31.5 GM/DL — LOW (ref 32–36)
MCV RBC AUTO: 87.1 FL — SIGNIFICANT CHANGE UP (ref 80–100)
NRBC # BLD: 0 /100 WBCS — SIGNIFICANT CHANGE UP
NRBC # FLD: 0 K/UL — SIGNIFICANT CHANGE UP
PHOSPHATE SERPL-MCNC: 3.8 MG/DL — SIGNIFICANT CHANGE UP (ref 2.5–4.5)
PLATELET # BLD AUTO: 183 K/UL — SIGNIFICANT CHANGE UP (ref 150–400)
POTASSIUM SERPL-MCNC: 3.3 MMOL/L — LOW (ref 3.5–5.3)
POTASSIUM SERPL-SCNC: 3.3 MMOL/L — LOW (ref 3.5–5.3)
PROT SERPL-MCNC: 8 G/DL — SIGNIFICANT CHANGE UP (ref 6–8.3)
RBC # BLD: 3.87 M/UL — SIGNIFICANT CHANGE UP (ref 3.8–5.2)
RBC # FLD: 14.4 % — SIGNIFICANT CHANGE UP (ref 10.3–14.5)
SODIUM SERPL-SCNC: 139 MMOL/L — SIGNIFICANT CHANGE UP (ref 135–145)
T4 AB SER-ACNC: 7.86 UG/DL — SIGNIFICANT CHANGE UP (ref 5.1–13)
TSH SERPL-MCNC: 2.41 UIU/ML — SIGNIFICANT CHANGE UP (ref 0.27–4.2)
WBC # BLD: 3.59 K/UL — LOW (ref 3.8–10.5)
WBC # FLD AUTO: 3.59 K/UL — LOW (ref 3.8–10.5)

## 2022-08-09 PROCEDURE — 99233 SBSQ HOSP IP/OBS HIGH 50: CPT | Mod: GC

## 2022-08-09 PROCEDURE — 93306 TTE W/DOPPLER COMPLETE: CPT | Mod: 26

## 2022-08-09 RX ORDER — POTASSIUM CHLORIDE 20 MEQ
40 PACKET (EA) ORAL ONCE
Refills: 0 | Status: COMPLETED | OUTPATIENT
Start: 2022-08-09 | End: 2022-08-09

## 2022-08-09 RX ORDER — LISINOPRIL 2.5 MG/1
10 TABLET ORAL DAILY
Refills: 0 | Status: DISCONTINUED | OUTPATIENT
Start: 2022-08-09 | End: 2022-08-09

## 2022-08-09 RX ORDER — ALBUTEROL 90 UG/1
2 AEROSOL, METERED ORAL EVERY 6 HOURS
Refills: 0 | Status: DISCONTINUED | OUTPATIENT
Start: 2022-08-09 | End: 2022-08-10

## 2022-08-09 RX ORDER — ALBUTEROL 90 UG/1
1 AEROSOL, METERED ORAL DAILY
Refills: 0 | Status: DISCONTINUED | OUTPATIENT
Start: 2022-08-09 | End: 2022-08-09

## 2022-08-09 RX ORDER — LISINOPRIL 2.5 MG/1
10 TABLET ORAL DAILY
Refills: 0 | Status: DISCONTINUED | OUTPATIENT
Start: 2022-08-09 | End: 2022-08-10

## 2022-08-09 RX ADMIN — Medication 40 MILLIEQUIVALENT(S): at 09:17

## 2022-08-09 RX ADMIN — SENNA PLUS 2 TABLET(S): 8.6 TABLET ORAL at 21:40

## 2022-08-09 RX ADMIN — PANTOPRAZOLE SODIUM 40 MILLIGRAM(S): 20 TABLET, DELAYED RELEASE ORAL at 06:46

## 2022-08-09 RX ADMIN — AMBRISENTAN 10 MILLIGRAM(S): 10 TABLET, FILM COATED ORAL at 12:56

## 2022-08-09 RX ADMIN — ENOXAPARIN SODIUM 40 MILLIGRAM(S): 100 INJECTION SUBCUTANEOUS at 06:45

## 2022-08-09 RX ADMIN — Medication 25 MICROGRAM(S): at 06:45

## 2022-08-09 RX ADMIN — Medication 40 MILLIGRAM(S): at 07:15

## 2022-08-09 RX ADMIN — Medication 10 MILLIGRAM(S): at 07:15

## 2022-08-09 NOTE — PROGRESS NOTE ADULT - SUBJECTIVE AND OBJECTIVE BOX
CHIEF COMPLAINT: shortness of breath    Interval Events:  - CT chest completed  - denies shortness of breath, cough, chest pain, chest tightness, fever, chills this morning  - on NC 2-2.5L     REVIEW OF SYSTEMS:  Negative except as documented above.      OBJECTIVE:  ICU Vital Signs Last 24 Hrs  T(C): 36.8 (09 Aug 2022 06:45), Max: 36.8 (09 Aug 2022 06:45)  T(F): 98.2 (09 Aug 2022 06:45), Max: 98.2 (09 Aug 2022 06:45)  HR: 63 (09 Aug 2022 06:45) (60 - 85)  BP: 117/75 (09 Aug 2022 06:45) (104/80 - 139/81)  BP(mean): --  ABP: --  ABP(mean): --  RR: 18 (09 Aug 2022 06:45) (16 - 18)  SpO2: 98% (09 Aug 2022 06:45) (97% - 100%)    O2 Parameters below as of 09 Aug 2022 06:45  Patient On (Oxygen Delivery Method): nasal cannula  O2 Flow (L/min): 2.5            CAPILLARY BLOOD GLUCOSE          PHYSICAL EXAM:  General: NAD  HEENT: PERRL, EOMI, sclera anicteric, moist mucus membranes  Neck: supple, no JVD  Cardiovascular: RRR, no murmurs, rubs, or gallops  Respiratory: bilateral crackles, no wheezes, crackles, or rhonci  Abdomen: soft, nontender, nondistended, normoactive bowel sounds  Extremities: warm and well perfused, trace edema, no clubbing  Skin: no rashes  Neurological: Aox3, no focal deficits    HOSPITAL MEDICATIONS:  MEDICATIONS  (STANDING):  ambrisentan 10 milliGRAM(s) Oral daily  enoxaparin Injectable 40 milliGRAM(s) SubCutaneous every 24 hours  furosemide    Tablet 40 milliGRAM(s) Oral <User Schedule>  lactulose Syrup 10 Gram(s) Oral daily  levothyroxine 25 MICROGram(s) Oral daily  pantoprazole    Tablet 40 milliGRAM(s) Oral before breakfast  polyethylene glycol 3350 17 Gram(s) Oral daily  potassium chloride    Tablet ER 40 milliEquivalent(s) Oral once  senna 2 Tablet(s) Oral at bedtime    MEDICATIONS  (PRN):  ALBUTerol    90 MICROgram(s) HFA Inhaler 2 Puff(s) Inhalation every 6 hours PRN pulm htn      LABS:                        10.6   3.59  )-----------( 183      ( 09 Aug 2022 05:16 )             33.7     Hgb Trend: 10.6<--, 10.5<--  08-09    139  |  98  |  11  ----------------------------<  95  3.3<L>   |  28  |  0.76    Ca    9.4      09 Aug 2022 05:16  Phos  3.8     08-09  Mg     1.80     08-09    TPro  8.0  /  Alb  3.9  /  TBili  0.4  /  DBili  x   /  AST  15  /  ALT  6   /  AlkPhos  75  08-09    Creatinine Trend: 0.76<--, 0.79<--        Venous Blood Gas:  08-08 @ 11:40  7.36/62/31/35/42.4  VBG Lactate: 2.3      MICROBIOLOGY:       RADIOLOGY:  [x] Reviewed and interpreted by me    CT Chest w/ IV Cont:   Extensive pulmonary cystic changes, with a lower lobe predominance. These   findings are not significantly changed since the 2008 comparison study   with the exception of mildly increased reticular opacities in the left   upper lobe towards the apex which is nonspecific and may represent   infectious, inflammatory, or changes secondary to underlying ILD.    Similar-appearing esophageal dilation with layering fluid. These findings   are compatible with patient known history of scleroderma.    There is a 6 mm lingular nodule, slightly increased in size since 2012   study.    Recommend three-month follow-up noncontrast CT chest for further   evaluation of the above findings or submission of most recent outside   comparison chest CT for comparative purposes. If more recent chest CTs   are made available, an addendum can be issued.

## 2022-08-09 NOTE — PHYSICAL THERAPY INITIAL EVALUATION ADULT - ADDITIONAL COMMENTS
pt lives with her son and grandson in an apartment with 8 stairs to enter and an elevator to her apartment. prior to admission pt was independent with all mobility and ambulated without an assistive device. Pt owns a rolling walker. Pt denies any falls within the last year.     Pt. left comfortable standing at bedside with all tubes/lines intact, call bell in reach and in NAD. RN aware of current position

## 2022-08-09 NOTE — PROGRESS NOTE ADULT - SUBJECTIVE AND OBJECTIVE BOX
Daniel Guerrero PGY1  pager [phone] or Teams or check resident tab for coverage    Patient is a 66y old  Female who presents with a chief complaint of Shortness of Breath (09 Aug 2022 08:05)    SUBJECTIVE / OVERNIGHT EVENTS:    NAEO.  Patient this morning is,    Brief Daily Plan:    MEDICATIONS  MEDICATIONS  (STANDING):  ambrisentan 10 milliGRAM(s) Oral daily  enoxaparin Injectable 40 milliGRAM(s) SubCutaneous every 24 hours  furosemide    Tablet 40 milliGRAM(s) Oral <User Schedule>  lactulose Syrup 10 Gram(s) Oral daily  levothyroxine 25 MICROGram(s) Oral daily  pantoprazole    Tablet 40 milliGRAM(s) Oral before breakfast  polyethylene glycol 3350 17 Gram(s) Oral daily  senna 2 Tablet(s) Oral at bedtime    MEDICATIONS  (PRN):  ALBUTerol    90 MICROgram(s) HFA Inhaler 2 Puff(s) Inhalation every 6 hours PRN pulm htn      VITALS  Vital Signs Last 24 Hrs  T(C): 36.8 (09 Aug 2022 06:45), Max: 36.8 (09 Aug 2022 06:45)  T(F): 98.2 (09 Aug 2022 06:45), Max: 98.2 (09 Aug 2022 06:45)  HR: 63 (09 Aug 2022 06:45) (60 - 66)  BP: 117/75 (09 Aug 2022 06:45) (112/76 - 139/81)  BP(mean): --  RR: 18 (09 Aug 2022 06:45) (16 - 18)  SpO2: 98% (09 Aug 2022 06:45) (97% - 100%)    Parameters below as of 09 Aug 2022 06:45  Patient On (Oxygen Delivery Method): nasal cannula  O2 Flow (L/min): 2.5      PHYSICAL EXAM:  GENERAL: no distress  PSYCH: A&O x3  HEAD: Atraumatic, Normocephalic  NECK: Supple, No JVD  CHEST/LUNG: clear to auscultation bilaterally  HEART: regular rate and rhythm, no murmurs  ABDOMEN: nontender to palpation, no rebound tenderness/guarding  EXTREMITIES: no edema on bilateral LE  NEUROLOGY: no focal neurologic deficit  SKIN: No rashes or lesions    LABS:  All labs personally Reviewed.    RADIOLOGY & ADDITIONAL TESTS:  All imaging personally Reviewed.  Daniel Guerrero PGY1  pager [phone] or Teams or check resident tab for coverage    Patient is a 66y old  Female who presents with a chief complaint of Shortness of Breath (09 Aug 2022 08:05)    SUBJECTIVE / OVERNIGHT EVENTS:    NAEO.  Patient this morning is, doing well. Received dulcolax this morning. Had small bowel movement.   Shortness of breath is at baseline, none at rest.    Brief Daily Plan:  ·	Pulm consulted, appreciate recs  ·	Pending echo  ·	c/w home medications  ·	c/w bowel regimen    MEDICATIONS  MEDICATIONS  (STANDING):  ambrisentan 10 milliGRAM(s) Oral daily  enoxaparin Injectable 40 milliGRAM(s) SubCutaneous every 24 hours  furosemide    Tablet 40 milliGRAM(s) Oral <User Schedule>  lactulose Syrup 10 Gram(s) Oral daily  levothyroxine 25 MICROGram(s) Oral daily  pantoprazole    Tablet 40 milliGRAM(s) Oral before breakfast  polyethylene glycol 3350 17 Gram(s) Oral daily  senna 2 Tablet(s) Oral at bedtime    MEDICATIONS  (PRN):  ALBUTerol    90 MICROgram(s) HFA Inhaler 2 Puff(s) Inhalation every 6 hours PRN pulm htn      VITALS  Vital Signs Last 24 Hrs  T(C): 36.8 (09 Aug 2022 06:45), Max: 36.8 (09 Aug 2022 06:45)  T(F): 98.2 (09 Aug 2022 06:45), Max: 98.2 (09 Aug 2022 06:45)  HR: 63 (09 Aug 2022 06:45) (60 - 66)  BP: 117/75 (09 Aug 2022 06:45) (112/76 - 139/81)  BP(mean): --  RR: 18 (09 Aug 2022 06:45) (16 - 18)  SpO2: 98% (09 Aug 2022 06:45) (97% - 100%)    Parameters below as of 09 Aug 2022 06:45  Patient On (Oxygen Delivery Method): nasal cannula  O2 Flow (L/min): 2.5      PHYSICAL EXAM:  GENERAL: no distress  PSYCH: A&O x3  HEAD: Atraumatic, Normocephalic  NECK: Supple, No JVD  CHEST/LUNG: diffuse inspiratory crackles  HEART: regular rate and rhythm, no murmurs  ABDOMEN: mildly tender to palpation, no rebound tenderness/guarding  EXTREMITIES: no edema on bilateral LE  NEUROLOGY: no focal neurologic deficit  SKIN: No rashes or lesions    LABS:  All labs personally Reviewed.    RADIOLOGY & ADDITIONAL TESTS:  All imaging personally Reviewed.

## 2022-08-09 NOTE — PHYSICAL THERAPY INITIAL EVALUATION ADULT - PERTINENT HX OF CURRENT PROBLEM, REHAB EVAL
Pt is a 66 year old female with a history of scleroderma, ILD (on 2.5 - 3L home oxygen), pulmonary hypertension, hypothyroidism, GERD with progressively worsening dyspnea on exertion over last month. Sent in by pulmonologist Dr. Perez for further workup.

## 2022-08-09 NOTE — PROGRESS NOTE ADULT - PROBLEM SELECTOR PLAN 1
Follows Dr. Perez. Long standing history of ILD likely 2/2 scleroderma c/b pulmonary hypertension. On home O2 2.5L. PFT 2015 with restrictive lung defect. On Letairis, Lasix, lisinopril. VBG this admission with chronic respiratory acidosis. Bicarb was lower in March 2022.   - Pulmonary consulted, appreciate recs  - home Letairis 10 mg PO qD  - home Lasix 20 mg qD  - home lisinopril 10 mg PO qD  - CT chest w/ changes consistent with ILD, 6mm lingular nodule  - pending Echo Follows Dr. Perez. Long standing history of ILD likely 2/2 scleroderma c/b pulmonary hypertension. On home O2 2.5L. PFT 2015 with restrictive lung defect. On Letairis, Lasix, lisinopril. VBG this admission with chronic respiratory acidosis. Bicarb was lower in March 2022.   - Pulmonary consulted, appreciate recs  - home Letairis 10 mg PO qD  - home Lasix 20/40 mg qD  - home lisinopril 10 mg PO qD  - CT chest w/ changes consistent with ILD, 6mm lingular nodule  - pending Echo

## 2022-08-09 NOTE — PROGRESS NOTE ADULT - PROBLEM SELECTOR PLAN 6
Chronic constipation. Has been prescribed lactulose and senna chronically without significant relief. Last bowel movement was 4 days prior to admission.  - home lactulose  - home senna  - miralax  - dulcolax suppository Chronic constipation. Has been prescribed lactulose and senna chronically without significant relief. Last bowel movement was 4 days prior to admission.  - home lactulose  - home senna  - miralax prn  - dulcolax suppository prn

## 2022-08-09 NOTE — PHYSICAL THERAPY INITIAL EVALUATION ADULT - PATIENT PROFILE REVIEW, REHAB EVAL
yes PT orders received, chart reviewed. ACTIVITY: ambulate as tolerated RNEstefany, endorsed pt to PT initial evaluation. Pt willing and able to participate in therapy session./yes

## 2022-08-09 NOTE — PHYSICAL THERAPY INITIAL EVALUATION ADULT - LEVEL OF INDEPENDENCE: BED TO CHAIR, REHAB EVAL
Left several messages for the patient to call back and the patient has not returned any of our calls  pt declined at this time

## 2022-08-10 ENCOUNTER — TRANSCRIPTION ENCOUNTER (OUTPATIENT)
Age: 67
End: 2022-08-10

## 2022-08-10 VITALS
OXYGEN SATURATION: 100 % | DIASTOLIC BLOOD PRESSURE: 65 MMHG | RESPIRATION RATE: 18 BRPM | HEART RATE: 90 BPM | TEMPERATURE: 98 F | SYSTOLIC BLOOD PRESSURE: 100 MMHG

## 2022-08-10 LAB
ANION GAP SERPL CALC-SCNC: 11 MMOL/L — SIGNIFICANT CHANGE UP (ref 7–14)
BUN SERPL-MCNC: 16 MG/DL — SIGNIFICANT CHANGE UP (ref 7–23)
CALCIUM SERPL-MCNC: 9.4 MG/DL — SIGNIFICANT CHANGE UP (ref 8.4–10.5)
CHLORIDE SERPL-SCNC: 100 MMOL/L — SIGNIFICANT CHANGE UP (ref 98–107)
CO2 SERPL-SCNC: 28 MMOL/L — SIGNIFICANT CHANGE UP (ref 22–31)
CREAT SERPL-MCNC: 0.9 MG/DL — SIGNIFICANT CHANGE UP (ref 0.5–1.3)
EGFR: 71 ML/MIN/1.73M2 — SIGNIFICANT CHANGE UP
GLUCOSE SERPL-MCNC: 94 MG/DL — SIGNIFICANT CHANGE UP (ref 70–99)
HCT VFR BLD CALC: 36.1 % — SIGNIFICANT CHANGE UP (ref 34.5–45)
HGB BLD-MCNC: 10.9 G/DL — LOW (ref 11.5–15.5)
MAGNESIUM SERPL-MCNC: 1.8 MG/DL — SIGNIFICANT CHANGE UP (ref 1.6–2.6)
MCHC RBC-ENTMCNC: 26.8 PG — LOW (ref 27–34)
MCHC RBC-ENTMCNC: 30.2 GM/DL — LOW (ref 32–36)
MCV RBC AUTO: 88.7 FL — SIGNIFICANT CHANGE UP (ref 80–100)
NRBC # BLD: 0 /100 WBCS — SIGNIFICANT CHANGE UP
NRBC # FLD: 0 K/UL — SIGNIFICANT CHANGE UP
PHOSPHATE SERPL-MCNC: 4.6 MG/DL — HIGH (ref 2.5–4.5)
PLATELET # BLD AUTO: 183 K/UL — SIGNIFICANT CHANGE UP (ref 150–400)
POTASSIUM SERPL-MCNC: 3.3 MMOL/L — LOW (ref 3.5–5.3)
POTASSIUM SERPL-SCNC: 3.3 MMOL/L — LOW (ref 3.5–5.3)
RBC # BLD: 4.07 M/UL — SIGNIFICANT CHANGE UP (ref 3.8–5.2)
RBC # FLD: 14.2 % — SIGNIFICANT CHANGE UP (ref 10.3–14.5)
SODIUM SERPL-SCNC: 139 MMOL/L — SIGNIFICANT CHANGE UP (ref 135–145)
WBC # BLD: 3.42 K/UL — LOW (ref 3.8–10.5)
WBC # FLD AUTO: 3.42 K/UL — LOW (ref 3.8–10.5)

## 2022-08-10 PROCEDURE — 99239 HOSP IP/OBS DSCHRG MGMT >30: CPT | Mod: GC

## 2022-08-10 PROCEDURE — 99232 SBSQ HOSP IP/OBS MODERATE 35: CPT | Mod: GC

## 2022-08-10 RX ORDER — POTASSIUM CHLORIDE 20 MEQ
40 PACKET (EA) ORAL ONCE
Refills: 0 | Status: COMPLETED | OUTPATIENT
Start: 2022-08-10 | End: 2022-08-10

## 2022-08-10 RX ORDER — FUROSEMIDE 40 MG
20 TABLET ORAL
Qty: 0 | Refills: 0 | DISCHARGE

## 2022-08-10 RX ORDER — POTASSIUM CHLORIDE 20 MEQ
40 PACKET (EA) ORAL ONCE
Refills: 0 | Status: DISCONTINUED | OUTPATIENT
Start: 2022-08-10 | End: 2022-08-10

## 2022-08-10 RX ORDER — FUROSEMIDE 40 MG
40 TABLET ORAL
Qty: 0 | Refills: 0 | DISCHARGE

## 2022-08-10 RX ORDER — POLYETHYLENE GLYCOL 3350 17 G/17G
17 POWDER, FOR SOLUTION ORAL
Refills: 0 | Status: DISCONTINUED | OUTPATIENT
Start: 2022-08-10 | End: 2022-08-10

## 2022-08-10 RX ORDER — MAGNESIUM SULFATE 500 MG/ML
1 VIAL (ML) INJECTION ONCE
Refills: 0 | Status: DISCONTINUED | OUTPATIENT
Start: 2022-08-10 | End: 2022-08-10

## 2022-08-10 RX ADMIN — LACTULOSE 10 GRAM(S): 10 SOLUTION ORAL at 11:52

## 2022-08-10 RX ADMIN — Medication 10 MILLIGRAM(S): at 10:11

## 2022-08-10 RX ADMIN — Medication 25 MICROGRAM(S): at 07:04

## 2022-08-10 RX ADMIN — Medication 20 MILLIGRAM(S): at 07:05

## 2022-08-10 RX ADMIN — AMBRISENTAN 10 MILLIGRAM(S): 10 TABLET, FILM COATED ORAL at 11:53

## 2022-08-10 RX ADMIN — ENOXAPARIN SODIUM 40 MILLIGRAM(S): 100 INJECTION SUBCUTANEOUS at 07:05

## 2022-08-10 RX ADMIN — PANTOPRAZOLE SODIUM 40 MILLIGRAM(S): 20 TABLET, DELAYED RELEASE ORAL at 07:04

## 2022-08-10 RX ADMIN — Medication 40 MILLIEQUIVALENT(S): at 11:57

## 2022-08-10 NOTE — DISCHARGE NOTE PROVIDER - NSDCCPTREATMENT_GEN_ALL_CORE_FT
PRINCIPAL PROCEDURE  Procedure: Transthoracic echocardiography  Findings and Treatment: PROCEDURE: Transthoracic echocardiogram with 2-D, M-Mode  and complete spectral and color flow Doppler.  INDICATION: Other specified pulmonary heart diseases  (I27.89)  ------------------------------------------------------------------------  DIMENSIONS:  Dimensions:     Normal Values:  LA:     2.8 cm    2.0 - 4.0 cm  Ao:     2.9 cm    2.0 - 3.8 cm  SEPTUM: 0.7 cm    0.6 - 1.2 cm  PWT:    0.7 cm    0.6 - 1.1 cm  LVIDd:  4.3 cm    3.0 - 5.6 cm  LVIDs:  2.9 cm    1.8 - 4.0 cm  Derived Variables:  LVMI: 49 g/m2  RWT: 0.32  Fractional short: 33 %  Ejection Fraction (Pope Rule): 64 %  ------------------------------------------------------------------------  OBSERVATIONS:  Mitral Valve: Normal mitral valve. Minimal mitral  regurgitation.  Aortic Root: Normal aortic root.  Aortic Valve: Normal trileaflet aortic valve.  Left Atrium: Normal left atrium.  Left Ventricle: Normal left ventricular systolic function.  No segmental wall motion abnormalities. Normal left  ventricular internal dimensions and wall thicknesses. Mild  diastolic dysfunction (Stage I).  Right Heart: Normal right atrium. Normal right ventricular  size and function. Normal tricuspid valve.   Mild tricuspid  regurgitation. Normal pulmonic valve.  Pericardium/PleuraNormal pericardium with no pericardial  effusion.  Hemodynamic: Estimated right ventricular systolic pressure  equals 46 mm Hg, assuming right atrial pressure equals 10  mm Hg, consistent with mild pulmonary hypertension.  ------------------------------------------------------------------------  CONCLUSIONS:  1. Normal mitral valve. Minimal mitral regurgitation.  2. Normal left ventricular internal dimensions and wall  thicknesses.  3. Normal left ventricular systolic function. No segmental  wall motion abnormalities.  4. Mild diastolic dysfunction (Stage I).  5. Normal right ventricular size and function.  6. Estimated right ventricular systolic pressure equals 46  mm Hg, assuming right atrial pressure equals 10 mm Hg,  consistent with mild pulmonary hypertension.        SECONDARY PROCEDURE  Procedure: CT chest w con  Findings and Treatment: IMPRESSION:  Extensive pulmonary cystic changes, with a lower lobe predominance. These   findings are not significantly changed since the 2008 comparison study   with the exception of mildly increased reticular opacities in the left   upper lobe towards the apex which is nonspecific and may represent   infectious, inflammatory, or changes secondary to underlying ILD.  Similar-appearing esophageal dilation with layering fluid. These findings   are compatible with patient known history of scleroderma.  There is a 6 mm lingular nodule, slightly increased in size since 2012   study.  Recommend three-month follow-up noncontrast CT chest for further   evaluation of the above findings or submission of most recent outside   comparison chest CT for comparative purposes. If more recent chest CTs   are made available, an addendum can be issued.  --- End of Report ---

## 2022-08-10 NOTE — PROVIDER CONTACT NOTE (OTHER) - ACTION/TREATMENT ORDERED:
Martin Kelly [id number] notified, no new orders. OK if patient refuses.
MD Daniel Guerrero [id number] notified, no new orders. Hold morning Lisinopril per parameter.
No intervention at this time.

## 2022-08-10 NOTE — DISCHARGE NOTE NURSING/CASE MANAGEMENT/SOCIAL WORK - NSDCFUADDAPPT_GEN_ALL_CORE_FT
Please make sure to follow up with Dr. Perez (Pulmonologist) within 1 month of being discharged from the hospital. Please discuss your lasix medications at your appointment.     Please make sure to follow up with your primary care doctor within 1 week of being discharged from the hospital.

## 2022-08-10 NOTE — PROGRESS NOTE ADULT - PROBLEM SELECTOR PLAN 6
Chronic constipation. Has been prescribed lactulose and senna chronically without significant relief. Last bowel movement was 4 days prior to admission.  - home lactulose  - home senna  - miralax prn  - dulcolax suppository prn

## 2022-08-10 NOTE — PROGRESS NOTE ADULT - PROBLEM SELECTOR PLAN 4
Long standing history of hypothyroidism. TSH wnl this admission.  - home levothyroxine 25 mcg qD
Long standing history of hypothyroidism  - home levothyroxine 25 mcg qD

## 2022-08-10 NOTE — DISCHARGE NOTE PROVIDER - NSDCCPCAREPLAN_GEN_ALL_CORE_FT
PRINCIPAL DISCHARGE DIAGNOSIS  Diagnosis: Interstitial lung disease  Assessment and Plan of Treatment: For your Interstitial Lung Disease and pulmonary hypertension, you were provided your home O2 and home medications. Pulmonology was consulted and ***.      SECONDARY DISCHARGE DIAGNOSES  Diagnosis: Pulmonary hypertension  Assessment and Plan of Treatment: For your pulmonary hypertension you were treated as described above for your interstitial lung disease.    Diagnosis: Scleroderma  Assessment and Plan of Treatment: For your scleroderma you were treated as described above for your interstitial lung disease.    Diagnosis: Constipation  Assessment and Plan of Treatment: For your chronic constipation, you were provided a bowel regimen while admitted which provided relief.    Diagnosis: Hypothyroidism  Assessment and Plan of Treatment: For your hypothyroidism, we checked a thyroid level which was normal, we continued your home synthroid dose.    Diagnosis: GERD (gastroesophageal reflux disease)  Assessment and Plan of Treatment: For your GERD, your proton pump inhibitor was continued.     PRINCIPAL DISCHARGE DIAGNOSIS  Diagnosis: Interstitial lung disease  Assessment and Plan of Treatment: For your Interstitial Lung Disease and pulmonary hypertension, you were provided your home O2 and home medications. Pulmonology was consulted and your images were reviewed by Dr. Perez. He would like you to continue your current home medications and see you back in clinic in one month.  Thank you for allowing us to be a part of your care team.  Team 5      SECONDARY DISCHARGE DIAGNOSES  Diagnosis: Scleroderma  Assessment and Plan of Treatment: For your scleroderma you were treated as described above for your interstitial lung disease.    Diagnosis: Pulmonary hypertension  Assessment and Plan of Treatment: For your pulmonary hypertension you were treated as described above for your interstitial lung disease.    Diagnosis: Hypothyroidism  Assessment and Plan of Treatment: For your hypothyroidism, we checked a thyroid level which was normal, we continued your home synthroid dose.    Diagnosis: Constipation  Assessment and Plan of Treatment: For your chronic constipation, you were provided a bowel regimen while admitted which provided relief.    Diagnosis: GERD (gastroesophageal reflux disease)  Assessment and Plan of Treatment: For your GERD, your proton pump inhibitor was continued.     PRINCIPAL DISCHARGE DIAGNOSIS  Diagnosis: Interstitial lung disease  Assessment and Plan of Treatment: For your Interstitial Lung Disease and pulmonary hypertension, you were provided your home O2 and home medications. Pulmonology was consulted and your images were reviewed by Dr. Perez. They would like you to continue taking your home medications and follow-up with Dr. Perez within 1 month.   If you have worsening shortness of breath, fevers, chest pain, vomiting, abdominal pain, diarrhea please seek medical attention.   Please make sure to follow up with your primary care doctor within 1 week of being discharged from the hospital.         SECONDARY DISCHARGE DIAGNOSES  Diagnosis: Constipation  Assessment and Plan of Treatment: For your constipation we gave you your home medications. In addition, we gave you suppository medication to help you have bowel movements.   Please make sure to have routine follow-up with your primary care doctor.    Diagnosis: Hypothyroidism  Assessment and Plan of Treatment: For your hypothyroidism, we checked a thyroid level which was normal, we continued your home synthroid dose.

## 2022-08-10 NOTE — PROGRESS NOTE ADULT - PROBLEM SELECTOR PLAN 3
History of pulmonary hypertension 2/2 ILD. ILD 2/2 scleroderma.   - pulmonary hypertension management as above.
History of pulmonary hypertension 2/2 ILD. ILD 2/2 scleroderma.   - pulmonary hypertension management as above.

## 2022-08-10 NOTE — PROGRESS NOTE ADULT - PROBLEM SELECTOR PLAN 2
History of scleroderma from Age 20s c/b ILD. Additionally, s/p esophageal dilation in 2008 to reduce symptoms of reflux and aspiration.  - ILD management as above.
History of scleroderma from Age 20s c/b ILD. Additionally, s/p esophageal dilation in 2008 to reduce symptoms of reflux and aspiration.  - ILD management as above.

## 2022-08-10 NOTE — DISCHARGE NOTE PROVIDER - NSDCMRMEDTOKEN_GEN_ALL_CORE_FT
furosemide 20 mg oral tablet: 20 milligram(s) orally every other day  furosemide 40 mg oral tablet: 40 milligram(s) orally every other day  lactulose 10 g/15 mL oral solution: 15 milliliter(s) orally once a day  Letairis 10 mg oral tablet: 1 tab(s) orally once a day  levothyroxine 25 mcg (0.025 mg) oral tablet: 1 tab(s) orally once a day  lisinopril 10 mg oral tablet: 1 tab(s) orally once a day  omeprazole 40 mg oral delayed release capsule: 40 milligram(s) orally once a day, As Needed  Senna 8.6 mg oral tablet: 1 tab(s) orally once a day (at bedtime)   bisacodyl 10 mg rectal suppository: 1 suppository(ies) rectal once a day, As Needed -Constipation   furosemide 20 mg oral tablet: 20 milligram(s) orally every other day    Take on Sunday/Monday/Wednesday/Friday   furosemide 40 mg oral tablet: 40 milligram(s) orally every other day    Please take Tuesday/Thursday/Friday   lactulose 10 g/15 mL oral solution: 15 milliliter(s) orally once a day  Letairis 10 mg oral tablet: 1 tab(s) orally once a day  levothyroxine 25 mcg (0.025 mg) oral tablet: 1 tab(s) orally once a day  lisinopril 10 mg oral tablet: 1 tab(s) orally once a day  omeprazole 40 mg oral delayed release capsule: 40 milligram(s) orally once a day, As Needed  Senna 8.6 mg oral tablet: 1 tab(s) orally once a day (at bedtime)

## 2022-08-10 NOTE — DISCHARGE NOTE PROVIDER - CARE PROVIDER_API CALL
Mary Perez)  Critical Care Medicine; Internal Medicine; Pulmonary Disease; Sleep Medicine  [address] [place] [id number]  Phone: [phone]  Fax: [phone]  Established Patient  Follow Up Time:

## 2022-08-10 NOTE — DISCHARGE NOTE PROVIDER - HOSPITAL COURSE
66Y F history of scleroderma (from age 20s, s/p esophageal dilation 2008) c/b ILD (on 2.5L O2 at home) c/b pulmonary HTN (follows Dr. Perez), hypothyroidism, GERD presenting with months of progressively worsening shortness of breath with exertion. Saw pulmonologist Dr. Perez outpatient two weeks prior to admission, was asked to come inpatient for CT chest, TTE and medical optimization. On arrival, patient at her baseline O2, satting well, without dyspnea at rest. Pulmonology was consulted and her home medications were continued. CT chest revealed ***. TTE revealed ***. Pulmonology ***. Patient discharged on her home O2 2.5L with plan for follow up ***. 66Y F history of scleroderma (from age 20s, s/p esophageal dilation 2008) c/b ILD (on 2.5L O2 at home) c/b pulmonary HTN (follows Dr. Perez), hypothyroidism, GERD presenting with months of progressively worsening shortness of breath with exertion. Saw pulmonologist Dr. Perez outpatient two weeks prior to admission, was asked to come inpatient for CT chest, TTE and medical optimization. On arrival, patient at her baseline O2, satting well, without dyspnea at rest. Pulmonology was consulted and her home medications were continued. CT chest revealed no significant changes from recent previous CT. Pulm consulted and had no new reccs after CT result. TTE revealed normal LV and RV function, with mild diastolic dysfunction. Pulmonology reccomended for patient to follow-up with Dr. Perez in 1 month and continue alternating Lasix doses at home. Patient to be discharged on home O2 settings.  Medically stable for discharge with outpatient follow-up.

## 2022-08-10 NOTE — PROVIDER CONTACT NOTE (OTHER) - BACKGROUND
Patient admitted for interstitial lung disease.
Patient admitted for shortness of breath.
Pt was admitted for Interstitial lung disease

## 2022-08-10 NOTE — PROGRESS NOTE ADULT - PROBLEM SELECTOR PLAN 1
Follows Dr. Perez. Long standing history of ILD likely 2/2 scleroderma c/b pulmonary hypertension. On home O2 2.5L. PFT 2015 with restrictive lung defect. On Letairis, Lasix, lisinopril. VBG this admission with chronic respiratory acidosis. Bicarb was lower in March 2022.   - Pulmonary consulted, appreciate recs  - home Letairis 10 mg PO qD  - home Lasix 20/40 mg qD  - home lisinopril 10 mg PO qD  - CT chest w/ changes consistent with ILD, 6mm lingular nodule  - pending Echo

## 2022-08-10 NOTE — DISCHARGE NOTE NURSING/CASE MANAGEMENT/SOCIAL WORK - NSDCPEFALRISK_GEN_ALL_CORE
For information on Fall & Injury Prevention, visit: [email or web address] OR  [email or web address] OR  https://www.cdc.gov/steadi/patient.html

## 2022-08-10 NOTE — PROVIDER CONTACT NOTE (OTHER) - SITUATION
Pt refused dulcolax suppository and senna
/67, holding Lisinopril as per parameter hold for systolic <110.
Patient refused 2200 Remeron & Albuterol inhaler.

## 2022-08-10 NOTE — PROGRESS NOTE ADULT - TIME BILLING
chart and data review, clinical assessment, coordination of care
discharge home today if cleared by pulmonary

## 2022-08-10 NOTE — PROGRESS NOTE ADULT - ASSESSMENT
65 yo F with PMH scleroderma, ILD (on 2-3 L home O2), and pulmonary hypertension who presents with shortness of breath, possible differential includes progression of ILD vs pHTN.    Recommendations:  - CT chest and TTE reviewed; both without significant changes from prior -- discussed with Dr. Perez  - no further testing required inpatient  - no contraindication to discharge from pulmonary standpoint  - can be discharged on home lasix (alternating 20mg/40mg)  - continue letairis   - continue supplemental O2 as needed (already has home O2)  - f/u with Dr. Perez in 1 month  
66F history of scleroderma, ILD (on 2.5 - 3L home O2), pulmonary hypertension, hypothyroidism, GERD with progressively worsening dyspnea on exertion over last month. Sent in by pulmonologist Dr. Perez for further workup. 
66F history of scleroderma, ILD (on 2.5 - 3L home O2), pulmonary hypertension, hypothyroidism, GERD with progressively worsening dyspnea on exertion over last month. Sent in by pulmonologist Dr. Perez for further workup.

## 2022-08-10 NOTE — PROVIDER CONTACT NOTE (OTHER) - ASSESSMENT
Patient refused 2200 Remeron & Albuterol inhaler. Patient A&Ox4. No distress at this time. Son at bedside. Patient stated Remeron does not help her and does not want. Stated no need for inhaler at this time.
Pt refused dulcolax suppository and senna
/67, holding Lisinopril as per parameter hold for systolic <110. HR 60. Patient A&Ox4.

## 2022-08-10 NOTE — PROGRESS NOTE ADULT - SUBJECTIVE AND OBJECTIVE BOX
Daniel Guerrero PGY1  pager [phone] or Teams or check resident tab for coverage    Patient is a 66y old  Female who presents with a chief complaint of Shortness of Breath (09 Aug 2022 13:49)    SUBJECTIVE / OVERNIGHT EVENTS:    NAEO.  Patient this morning is, doing well. No complaints.  Has only had one bowel movement in last six days. Would like to try miralax today but not enema.    Brief Daily Plan:  ·	Bowel regimen  ·	f/u Echo Read  ·	Pulm consulted, appreciate recs    MEDICATIONS  MEDICATIONS  (STANDING):  ambrisentan 10 milliGRAM(s) Oral daily  bisacodyl Suppository 10 milliGRAM(s) Rectal once  enoxaparin Injectable 40 milliGRAM(s) SubCutaneous every 24 hours  furosemide    Tablet 40 milliGRAM(s) Oral <User Schedule>  furosemide    Tablet 20 milliGRAM(s) Oral <User Schedule>  lactulose Syrup 10 Gram(s) Oral daily  levothyroxine 25 MICROGram(s) Oral daily  lisinopril 10 milliGRAM(s) Oral daily  magnesium sulfate  IVPB 1 Gram(s) IV Intermittent once  pantoprazole    Tablet 40 milliGRAM(s) Oral before breakfast  polyethylene glycol 3350 17 Gram(s) Oral two times a day  potassium chloride   Powder 40 milliEquivalent(s) Oral once  senna 2 Tablet(s) Oral at bedtime    MEDICATIONS  (PRN):  ALBUTerol    90 MICROgram(s) HFA Inhaler 2 Puff(s) Inhalation every 6 hours PRN pulm htn  bisacodyl Suppository 10 milliGRAM(s) Rectal daily PRN Constipation      VITALS  Vital Signs Last 24 Hrs  T(C): 36.5 (10 Aug 2022 07:02), Max: 36.5 (09 Aug 2022 15:30)  T(F): 97.7 (10 Aug 2022 07:02), Max: 97.7 (09 Aug 2022 15:30)  HR: 60 (10 Aug 2022 07:02) (60 - 89)  BP: 105/67 (10 Aug 2022 07:02) (105/67 - 117/78)  BP(mean): --  RR: 18 (10 Aug 2022 07:02) (17 - 18)  SpO2: 99% (10 Aug 2022 07:02) (89% - 99%)    Parameters below as of 10 Aug 2022 07:02  Patient On (Oxygen Delivery Method): nasal cannula  O2 Flow (L/min): 1      PHYSICAL EXAM:  GENERAL: no distress  PSYCH: A&O x3  HEAD: Atraumatic, Normocephalic  NECK: Supple, No JVD  CHEST/LUNG: Diffuse inspiratory crackles bilaterally  HEART: regular rate and rhythm, no murmurs  ABDOMEN: discomfort to palpation, no rebound tenderness/guarding  EXTREMITIES: no edema on bilateral LE  NEUROLOGY: no focal neurologic deficit  SKIN: No rashes or lesions    LABS:  All labs personally Reviewed.    RADIOLOGY & ADDITIONAL TESTS:  All imaging personally Reviewed.

## 2022-08-10 NOTE — PROGRESS NOTE ADULT - ATTENDING COMMENTS
66 year old female with history of scleroderma, interstitial lung disease, pulmonary hypertension, and chronic hypoxemic respiratory failure here with shortness of breath. CT chest and TTE findings reviewed. No further inpatient work up planned. Continue home dose diuretics and Letairis. Supplemental O2 to maintain SpO2 92 - 95%. Follow up with her pulmonologist Dr. Perez as outpatient.
66 year old female with a history of scleroderma, ILD (on 2.5 - 3L home O2), pulmonary hypertension, hypothyroidism and GERD presenting with worsening shortness of breath over the past few months.    patient reports feeling well, wants to know if she can get a portable oxygen concentrator [employer]. states her son can come pick her up from the hospital today if she is to be discharged.    #Shortness of breath, ILD  -CT chest performed, no acute changes noted but slightly worsening appearance of reticular opacities in CRISELDA  -pulmonary following, will f/u recs  -low suspicion for acute infection, will monitor off abx  -TTE showing mild pulmonary HTN  -c/w home meds Lasix alternating 20mg/40mg every other day, Letairis  -c/w supplemental O2, currently at baseline requirements  -CM follow up for home oxygen supplies    #Constipation  -Dulcolax suppository given this morning with small passage of stool  -very small BM this morning after suppository  -tap water enema if no BM by this afternoon    d/w HS 5
66 year old female with a history of scleroderma, ILD (on 2.5 - 3L home O2), pulmonary hypertension, hypothyroidism and GERD presenting with worsening shortness of breath over the past few months.    patient reports feeling the same from yesterday, does not feel SOB unless she is walking. wants to know if she qualifies for a portable oxygen concentrator at home.    #Shortness of breath, ILD  -CT chest performed, no acute changes noted but slightly worsening appearance of reticular opacities in CRISELDA  -pulmonary following, will f/u recs  -low suspicion for acute infection, will monitor off abx  -TTE ordered to evaluate pulmonary HTN  -c/w home meds Lasix alternating 20mg/40mg every other day, Letairis  -c/w supplemental O2, currently at baseline requirements  -CM follow up for home oxygen supplies    #Constipation  -Dulcolax suppository given this morning with small passage of stool  -if no BM after suppository will give tap water enema  -c/w home lactulose and senna, added Miralax    d/w HS 5

## 2022-08-10 NOTE — DISCHARGE NOTE PROVIDER - NSDCFUADDAPPT_GEN_ALL_CORE_FT
Please schedule a follow up appointment with your pulmonologist Dr. Perez in one month.  Please make sure to follow up with Dr. Perez (Pulmonologist) within 1 month of being discharged from the hospital    Please make sure to follow up with your primary care doctor within 1 week of being discharged from the hospital.  Please make sure to follow up with Dr. Perez (Pulmonologist) within 1 month of being discharged from the hospital. Please discuss your lasix medications at your appointment.     Please make sure to follow up with your primary care doctor within 1 week of being discharged from the hospital.

## 2022-08-10 NOTE — PROGRESS NOTE ADULT - PROBLEM SELECTOR PLAN 7
DVT ppx: Lovenox  GI ppx: home ppi  Disposition: Likely home, with home O2
DVT ppx: Lovenox  GI ppx: home ppi  Disposition: Likely home, with home O2

## 2022-08-10 NOTE — PROVIDER CONTACT NOTE (OTHER) - REASON
/67, holding Lisinopril as per parameter hold for systolic <110.
Pt refused dulcolax suppository and senna
Patient refused 2200 Remeron & Albuterol inhaler.

## 2022-08-10 NOTE — PROGRESS NOTE ADULT - SUBJECTIVE AND OBJECTIVE BOX
CHIEF COMPLAINT: shortness of breath    Interval Events:  - remains on 2L NC  - denies sob, chest pain, cough, chest tightness, fever, chills      REVIEW OF SYSTEMS:  Negative except as documented above.      OBJECTIVE:  ICU Vital Signs Last 24 Hrs  T(C): 36.5 (10 Aug 2022 07:02), Max: 36.5 (09 Aug 2022 15:30)  T(F): 97.7 (10 Aug 2022 07:02), Max: 97.7 (09 Aug 2022 15:30)  HR: 60 (10 Aug 2022 07:02) (60 - 89)  BP: 105/67 (10 Aug 2022 07:02) (105/67 - 117/78)  BP(mean): --  ABP: --  ABP(mean): --  RR: 18 (10 Aug 2022 07:02) (17 - 18)  SpO2: 99% (10 Aug 2022 07:02) (89% - 99%)    O2 Parameters below as of 10 Aug 2022 07:02  Patient On (Oxygen Delivery Method): nasal cannula  O2 Flow (L/min): 1            CAPILLARY BLOOD GLUCOSE          PHYSICAL EXAM:  General: NAD  HEENT: PERRL, EOMI, sclera anicteric, moist mucus membranes  Neck: supple, no JVD  Cardiovascular: RRR, no murmurs, rubs, or gallops  Respiratory: b/l crackles, no wheezes, crackles, or rhonci  Abdomen: soft, nontender, nondistended, normoactive bowel sounds  Extremities: warm and well perfused, no edema, no clubbing  Skin: no rashes  Neurological: Aox3, no focal deficits    HOSPITAL MEDICATIONS:  MEDICATIONS  (STANDING):  ambrisentan 10 milliGRAM(s) Oral daily  enoxaparin Injectable 40 milliGRAM(s) SubCutaneous every 24 hours  furosemide    Tablet 40 milliGRAM(s) Oral <User Schedule>  furosemide    Tablet 20 milliGRAM(s) Oral <User Schedule>  lactulose Syrup 10 Gram(s) Oral daily  levothyroxine 25 MICROGram(s) Oral daily  lisinopril 10 milliGRAM(s) Oral daily  magnesium sulfate  IVPB 1 Gram(s) IV Intermittent once  pantoprazole    Tablet 40 milliGRAM(s) Oral before breakfast  polyethylene glycol 3350 17 Gram(s) Oral two times a day  senna 2 Tablet(s) Oral at bedtime    MEDICATIONS  (PRN):  ALBUTerol    90 MICROgram(s) HFA Inhaler 2 Puff(s) Inhalation every 6 hours PRN pulm htn  bisacodyl Suppository 10 milliGRAM(s) Rectal daily PRN Constipation      LABS:                        10.9   3.42  )-----------( 183      ( 10 Aug 2022 05:43 )             36.1     Hgb Trend: 10.9<--, 10.6<--, 10.5<--  08-10    139  |  100  |  16  ----------------------------<  94  3.3<L>   |  28  |  0.90    Ca    9.4      10 Aug 2022 05:43  Phos  4.6     08-10  Mg     1.80     08-10    TPro  8.0  /  Alb  3.9  /  TBili  0.4  /  DBili  x   /  AST  15  /  ALT  6   /  AlkPhos  75  08-09    Creatinine Trend: 0.90<--, 0.76<--, 0.79<--            MICROBIOLOGY:       RADIOLOGY:  [x] Reviewed and interpreted by me

## 2022-08-10 NOTE — DISCHARGE NOTE NURSING/CASE MANAGEMENT/SOCIAL WORK - PATIENT PORTAL LINK FT
You can access the FollowMyHealth Patient Portal offered by [facility] by registering at the following website: [email or web address]. By joining [facility]’s FollowMyHealth portal, you will also be able to view your health information using other applications (apps) compatible with our system.

## 2022-08-23 DIAGNOSIS — R93.89 ABNORMAL FINDINGS ON DIAGNOSTIC IMAGING OF OTHER SPECIFIED BODY STRUCTURES: ICD-10-CM

## 2022-08-29 PROBLEM — E03.9 HYPOTHYROIDISM, UNSPECIFIED: Chronic | Status: ACTIVE | Noted: 2022-08-08

## 2022-08-29 PROBLEM — K21.9 GASTRO-ESOPHAGEAL REFLUX DISEASE WITHOUT ESOPHAGITIS: Chronic | Status: ACTIVE | Noted: 2022-08-08

## 2022-08-29 PROBLEM — I27.20 PULMONARY HYPERTENSION, UNSPECIFIED: Chronic | Status: ACTIVE | Noted: 2022-08-08

## 2022-08-29 PROBLEM — J84.9 INTERSTITIAL PULMONARY DISEASE, UNSPECIFIED: Chronic | Status: ACTIVE | Noted: 2022-08-08

## 2022-08-29 PROBLEM — M34.9 SYSTEMIC SCLEROSIS, UNSPECIFIED: Chronic | Status: ACTIVE | Noted: 2022-08-08

## 2022-09-11 ENCOUNTER — NON-APPOINTMENT (OUTPATIENT)
Age: 67
End: 2022-09-11

## 2022-09-12 ENCOUNTER — APPOINTMENT (OUTPATIENT)
Dept: PULMONOLOGY | Facility: CLINIC | Age: 67
End: 2022-09-12

## 2022-09-12 ENCOUNTER — APPOINTMENT (OUTPATIENT)
Age: 67
End: 2022-09-12

## 2022-09-12 VITALS
WEIGHT: 174 LBS | SYSTOLIC BLOOD PRESSURE: 119 MMHG | HEIGHT: 63 IN | TEMPERATURE: 97.4 F | RESPIRATION RATE: 16 BRPM | BODY MASS INDEX: 30.83 KG/M2 | HEART RATE: 70 BPM | DIASTOLIC BLOOD PRESSURE: 77 MMHG

## 2022-09-12 LAB
BASOPHILS # BLD AUTO: 0.03 K/UL
BASOPHILS NFR BLD AUTO: 0.8 %
EOSINOPHIL # BLD AUTO: 0.22 K/UL
EOSINOPHIL NFR BLD AUTO: 5.8 %
HCT VFR BLD CALC: 35.8 %
HGB BLD-MCNC: 11.1 G/DL
IMM GRANULOCYTES NFR BLD AUTO: 0.3 %
LYMPHOCYTES # BLD AUTO: 1.23 K/UL
LYMPHOCYTES NFR BLD AUTO: 32.6 %
MAN DIFF?: NORMAL
MCHC RBC-ENTMCNC: 27.2 PG
MCHC RBC-ENTMCNC: 31 GM/DL
MCV RBC AUTO: 87.7 FL
MONOCYTES # BLD AUTO: 0.34 K/UL
MONOCYTES NFR BLD AUTO: 9 %
NEUTROPHILS # BLD AUTO: 1.94 K/UL
NEUTROPHILS NFR BLD AUTO: 51.5 %
PLATELET # BLD AUTO: 158 K/UL
RBC # BLD: 4.08 M/UL
RBC # FLD: 14.6 %
WBC # FLD AUTO: 3.77 K/UL

## 2022-09-12 PROCEDURE — 36415 COLL VENOUS BLD VENIPUNCTURE: CPT

## 2022-09-12 PROCEDURE — 96372 THER/PROPH/DIAG INJ SC/IM: CPT

## 2022-09-12 PROCEDURE — 99215 OFFICE O/P EST HI 40 MIN: CPT | Mod: 25

## 2022-09-12 PROCEDURE — ZZZZZ: CPT

## 2022-09-12 PROCEDURE — 94618 PULMONARY STRESS TESTING: CPT

## 2022-09-12 NOTE — DISCUSSION/SUMMARY
[FreeTextEntry1] : -ASSESSMENT PLAN--------67year old female Patient has history of scleroderma with positive Sjogrens antibodies. ------ She has interstitial lung disease and pulmonary hypertension. She will continue on oxygen, Lasix, LETARIS ------HAS NOT FOLLOW UP WITH Dr. Aguilar regarding her rheumatologic workup. -----------  HER ECHO AND CT ARE DONE AT [facility]   ON SUPPLEMENTAL        O2 3 liters per minute\par \par \par \par 1-------PAH--------WILL CONTINUE WITH LETAIRIS--and oxygen therapy----. Given increased STEEN and LE edema, will increase lasix to 20mg and 40 mg alternating every other day. Will try to obtain records from [facility] of recent echo----- ECHO AT [facility] --PASP 46 NNEEDS A REPEAT RIGHT HEART CATH---- ---WILL Consider TO  ADD INHALED REMODULIN [   TYVASO  ]  FOR SECONDARY PULM HTN  ----- \par 2 ---HAS ILD  --- scleroderma related ILD --- with a lingular nodule ------- repeat CT in 3 months -------- continue low-dose prednisone \par 3--Labs drawn in our office today- CBC, CMP and pBNP\par 4--ADVISED  TO GET AN OPINION FROM LUNG TRANSPLANT POINT OF VIEW---ADVISED TO F/U DR BARBA \par \par --Thanks for allowing me to participate in the care of this patient. Patient at this time will follow the above mentioned recommendations and return back for follow up visit. If you have any questions I can be reached at [phone] or [phone]. \par \par Mary Perez MD, FCCP \par Director, Pulmonary Hypertension Program \par [facility] \par Division of Pulmonary, Critical Care and Sleep Medicine \par  Professor of Medicine \par [facility]\par \par TYRELL PollardC\par \par . \par

## 2022-09-12 NOTE — PHYSICAL EXAM
[No Acute Distress] : no acute distress [Normal Oropharynx] : normal oropharynx [Normal Appearance] : normal appearance [No Neck Mass] : no neck mass [Normal Rate/Rhythm] : normal rate/rhythm [Normal S1, S2] : normal s1, s2 [No Murmurs] : no murmurs [No Resp Distress] : no resp distress [No Abnormalities] : no abnormalities [Benign] : benign [Normal Gait] : normal gait [No Clubbing] : no clubbing [No Cyanosis] : no cyanosis [FROM] : FROM [1+ Pitting] : 1+ pitting [Normal Color/ Pigmentation] : normal color/ pigmentation [No Focal Deficits] : no focal deficits [Oriented x3] : oriented x3 [Normal Affect] : normal affect [TextBox_68] : bilateral inspiratory crackles in the mid- lower lung fields

## 2022-09-13 LAB
ALBUMIN SERPL ELPH-MCNC: 3.9 G/DL
ALP BLD-CCNC: 94 U/L
ALT SERPL-CCNC: 5 U/L
AMYLASE/CREAT SERPL: 181 U/L
ANION GAP SERPL CALC-SCNC: 12 MMOL/L
AST SERPL-CCNC: 13 U/L
BILIRUB SERPL-MCNC: 0.2 MG/DL
BUN SERPL-MCNC: 13 MG/DL
CALCIUM SERPL-MCNC: 9.8 MG/DL
CHLORIDE SERPL-SCNC: 102 MMOL/L
CO2 SERPL-SCNC: 26 MMOL/L
CREAT SERPL-MCNC: 0.75 MG/DL
EGFR: 87 ML/MIN/1.73M2
GGT SERPL-CCNC: 21 U/L
GLUCOSE SERPL-MCNC: 94 MG/DL
LPL SERPL-CCNC: 26 U/L
NT-PROBNP SERPL-MCNC: 76 PG/ML
POTASSIUM SERPL-SCNC: 4.4 MMOL/L
PROT SERPL-MCNC: 8.2 G/DL
SODIUM SERPL-SCNC: 141 MMOL/L

## 2023-03-04 NOTE — HISTORY OF PRESENT ILLNESS
Patient transported off of unit to psych via wheelchair transport with police escort patient calm cooperative no s/s distress noted at time of discharge/transfer [TextBox_4] : ---68 yo F initially diagnosed with scleroderma back when she was in her 20s.  She has interstitial lung disease with pulmonary hypertension.  She had a RHC in 2008 which did not reveal  elevated PASP with  ? vasodilator responsiveness.  has scleroderma- \par She also has history of significant esophageal involvement.  She underwent esophageal dilatation in 2009 which helped with her recurrent aspiration.  She also has history of sickle cell trait and history of hypothyroidism. Her exercise tolerance has remanned the same.  Medication wise, she is one, levothyroxine, furosemide, lisinopril, Letairis. and nebulizers. ----------She was previously on Ventavis & Revatio. ---NOW ON     Letaris  and Symbicort AND PROMISES TO BE COMPLIANT TO TREATMENT ---- ----\par \par ----PFT------- --DEC 2019-------UNABLE TO DO A COMPLETE PFT-------9/1/15 restrictive lung defect w/bronchodilator response and severely decreased DLCO. ------\par \par PFT  SEPT 2107----EVIDENCE OF RESTRICTIVE ND OBSTRUCTIVE LUNG DEFECT, DECREASED DLCO----\par -PFT SPIROMETRY  2022---SUGGESTIO OF RSTRICTIVE ---- \par - 6 MWT  ---2018----SEPT 228m \par 2016  342   METERS\par 2020   6MWT  ON SUPPLEMENTAL O2     206 METERS \par CT SCAN  CHEST      MARCH 2107--OUTSIDE  FACILITY---FILMS NOT AVAILABLE  FOR REVIEW  ----EMPHYSEMA  WITH BASILAR FIBROSIS IN THE SETTING OF SCLERODERMA---\par \par CT CHEST 12/2020  --[facility]    peripheral and basilar end stage fibrotic , moderate and severe centrilobular emphysema\par ----- She is using 3l/min of oxygen at present and complaint with her medication.--------------\par \par \par CT CHEST 8/2022\par IMPRESSION:\par \par Extensive pulmonary cystic changes, with a lower lobe predominance. These findings are not significantly changed since the 2008 comparison study with the exception of mildly increased reticular opacities in the left upper lobe towards the apex which is nonspecific and may represent infectious, inflammatory, or changes secondary to underlying ILD.\par \par Similar-appearing esophageal dilation with layering fluid. These findings are compatible with patient known history of scleroderma.\par \par There is a 6 mm lingular nodule, slightly increased in size since 2012 study.\par \par Recommend three-month follow-up noncontrast CT chest for further evaluation of the above findings or submission of most recent outside comparison chest CT for comparative purposes. If more recent chest CTs are made available, an addendum can be issued.\par \par ECHO 8/2022\par CONCLUSIONS:\par 1. Normal mitral valve. Minimal mitral regurgitation.\par 2. Normal left ventricular internal dimensions and wall\par thicknesses.\par 3. Normal left ventricular systolic function. No segmental\par wall motion abnormalities.\par 4. Mild diastolic dysfunction (Stage I).\par 5. Normal right ventricular size and function.\par 6. Estimated right ventricular systolic pressure equals 46\par mm Hg, assuming right atrial pressure equals 10 mm Hg,\par consistent with mild pulmonary hypertension.\par \par --\par ------MAR 2018-------is having cough and yellow sputum since 1 week ago----denies any fever \par or chills----is on 2L oxygen-----is on letairis\par \par -july 2019 PAH-------doing well from pulmonary stand pint-----on letairis----tolerating it well------pt states she had a CT scan done at [facility] [NO FILMS AVAILABLE]-----she's on oxygen at 2 lpm x 24 hrs -NNEDS REPAT ECHO\par \par -----DEC  2019-----------doing well from pulmonary stand pint-----on letairis----tolerating it well------pt states she had a CT scan done and ECHO  at [facility] [NO FILMS AVAILABLE]-----she's on oxygen at 2 lpm x 24 hrs------\par \par \par echo 12/2020 ---[facility]---est pasp 36mmHg\par 1/2021 pulm htn- on letairis, she has no current resp complaints, using oxygen at 2 lpm contiuously\par Follows [facility]- gets diagnostic tests done there\par \par 7/2021 pulm htn d/t scleroderma- on letairis and diuretics- on oxygen- stable from resp perspective\par c/o abd pain- following GI - was told she needs a colonoscopy ("stool backed up")\par She has not yet received covid vac\par \par \par \par 11/2021 pulm htn d/t scleroderma- on letairis and diuretics- on oxygen- stable from resp perspective. Getting testing at [facility]. She is up to date w/covid and influenza vac\par \par \par 3/2022  pulm htn d/t scleroderma- on letairis and diuretics- on oxygen @ 2 lpm- stable from resp perspective. STEEN unchanged. Getting testing at [facility]. She is up to date w/covid vac x 2 --needs  echo , ct and  pft\par \par 7/2022: Reports more fatigue since March. Continues to use 3L of O2, reports after exertion, her O2 drops from 99- 93% Continues letarais 10mg daily and 20mg of furosemide. Reports more LE swelling when she feels short of breath.  Reports echo this week at [facility] on Monday. Continues to use nebulizer at night during the summer time and as needed during the day. \par \par 9/2022  pulm htn d/t scleroderma- on letairis and diuretics- on oxygen @ 2 lpm- stable from resp perspective. s/p hospitalization- doing well except for chronic abd cramps-------------------UTD w/influenza vac\par -----------ct 2022--------- Extensive pulmonary cystic changes, with a lower lobe predominance. These findings are not significantly changed since the 2008 comparison study with the exception of mildly increased reticular opacities in the left upper lobe towards the apex which is nonspecific and may represent infectious, inflammatory, or changes secondary to underlying ILD.\par ------------------------------echo 2022---pasp 46

## 2023-12-19 ENCOUNTER — APPOINTMENT (OUTPATIENT)
Dept: PULMONOLOGY | Facility: CLINIC | Age: 68
End: 2023-12-19
Payer: MEDICARE

## 2023-12-19 ENCOUNTER — NON-APPOINTMENT (OUTPATIENT)
Age: 68
End: 2023-12-19

## 2023-12-19 VITALS
HEART RATE: 94 BPM | DIASTOLIC BLOOD PRESSURE: 73 MMHG | BODY MASS INDEX: 31.01 KG/M2 | OXYGEN SATURATION: 99 % | TEMPERATURE: 98 F | HEIGHT: 63 IN | WEIGHT: 175 LBS | RESPIRATION RATE: 18 BRPM | SYSTOLIC BLOOD PRESSURE: 103 MMHG

## 2023-12-19 DIAGNOSIS — R10.9 UNSPECIFIED ABDOMINAL PAIN: ICD-10-CM

## 2023-12-19 DIAGNOSIS — I27.20 PULMONARY HYPERTENSION, UNSPECIFIED: ICD-10-CM

## 2023-12-19 PROCEDURE — 99213 OFFICE O/P EST LOW 20 MIN: CPT | Mod: 25

## 2023-12-19 PROCEDURE — 36415 COLL VENOUS BLD VENIPUNCTURE: CPT

## 2023-12-19 NOTE — HISTORY OF PRESENT ILLNESS
[Former] : former [TextBox_4] : ---68 yo F initially diagnosed with scleroderma back when she was in her 20s.  She has interstitial lung disease with pulmonary hypertension.  She had a RHC in 2008 which did not reveal  elevated PASP with  ? vasodilator responsiveness.  has scleroderma-  She also has history of significant esophageal involvement.  She underwent esophageal dilatation in 2009 which helped with her recurrent aspiration.  She also has history of sickle cell trait and history of hypothyroidism. Her exercise tolerance has remanned the same.  Medication wise, she is one, levothyroxine, furosemide, lisinopril, Letairis. and nebulizers. ----------She was previously on Ventavis & Revatio. ---NOW ON     Letaris  and Symbicort AND PROMISES TO BE COMPLIANT TO TREATMENT ---- ----  ----PFT------- --DEC 2019-------UNABLE TO DO A COMPLETE PFT-------9/1/15 restrictive lung defect w/bronchodilator response and severely decreased DLCO. ------  PFT  SEPT 2107----EVIDENCE OF RESTRICTIVE ND OBSTRUCTIVE LUNG DEFECT, DECREASED DLCO---- -PFT SPIROMETRY  2022---SUGGESTIO OF RSTRICTIVE ----  - 6 MWT  ---2018----SEPT 228m  2016  342   METERS 2020   6MWT  ON SUPPLEMENTAL O2     206 METERS  CT SCAN  CHEST      MARCH 2107--OUTSIDE  FACILITY---FILMS NOT AVAILABLE  FOR REVIEW  ----EMPHYSEMA  WITH BASILAR FIBROSIS IN THE SETTING OF SCLERODERMA---  CT CHEST 12/2020  --[facility]    peripheral and basilar end stage fibrotic , moderate and severe centrilobular emphysema ----- She is using 3l/min of oxygen at present and complaint with her medication.--------------   CT CHEST 8/2022 IMPRESSION:  Extensive pulmonary cystic changes, with a lower lobe predominance. These findings are not significantly changed since the 2008 comparison study with the exception of mildly increased reticular opacities in the left upper lobe towards the apex which is nonspecific and may represent infectious, inflammatory, or changes secondary to underlying ILD.  Similar-appearing esophageal dilation with layering fluid. These findings are compatible with patient known history of scleroderma.  There is a 6 mm lingular nodule, slightly increased in size since 2012 study.  Recommend three-month follow-up noncontrast CT chest for further evaluation of the above findings or submission of most recent outside comparison chest CT for comparative purposes. If more recent chest CTs are made available, an addendum can be issued.  ECHO 8/2022 CONCLUSIONS: 1. Normal mitral valve. Minimal mitral regurgitation. 2. Normal left ventricular internal dimensions and wall thicknesses. 3. Normal left ventricular systolic function. No segmental wall motion abnormalities. 4. Mild diastolic dysfunction (Stage I). 5. Normal right ventricular size and function. 6. Estimated right ventricular systolic pressure equals 46 mm Hg, assuming right atrial pressure equals 10 mm Hg, consistent with mild pulmonary hypertension.  -- ------MAR 2018-------is having cough and yellow sputum since 1 week ago----denies any fever  or chills----is on 2L oxygen-----is on letairis  -july 2019 PAH-------doing well from pulmonary stand pint-----on letairis----tolerating it well------pt states she had a CT scan done at [facility] [NO FILMS AVAILABLE]-----she's on oxygen at 2 lpm x 24 hrs -NNEDS REPAT ECHO  -----DEC  2019-----------doing well from pulmonary stand pint-----on letairis----tolerating it well------pt states she had a CT scan done and ECHO  at [facility] [NO FILMS AVAILABLE]-----she's on oxygen at 2 lpm x 24 hrs------   echo 12/2020 ---[facility]---est pasp 36mmHg 1/2021 pulm htn- on letairis, she has no current resp complaints, using oxygen at 2 lpm contiuously Follows [facility]- gets diagnostic tests done there  7/2021 pulm htn d/t scleroderma- on letairis and diuretics- on oxygen- stable from resp perspective c/o abd pain- following GI - was told she needs a colonoscopy ("stool backed up") She has not yet received covid vac    11/2021 pulm htn d/t scleroderma- on letairis and diuretics- on oxygen- stable from resp perspective. Getting testing at [facility]. She is up to date w/covid and influenza vac   3/2022  pulm htn d/t scleroderma- on letairis and diuretics- on oxygen @ 2 lpm- stable from resp perspective. STEEN unchanged. Getting testing at [facility]. She is up to date w/covid vac x 2 --needs  echo , ct and  pft  7/2022: Reports more fatigue since March. Continues to use 3L of O2, reports after exertion, her O2 drops from 99- 93% Continues letarais 10mg daily and 20mg of furosemide. Reports more LE swelling when she feels short of breath.  Reports echo this week at [place] on Monday. Continues to use nebulizer at night during the summer time and as needed during the day.   9/2022  pulm htn d/t scleroderma- on letairis and diuretics- on oxygen @ 2 lpm- stable from resp perspective. s/p hospitalization- doing well except for chronic abd cramps-------------------UTD w/influenza vac -----------ct 2022--------- Extensive pulmonary cystic changes, with a lower lobe predominance. These findings are not significantly changed since the 2008 comparison study with the exception of mildly increased reticular opacities in the left upper lobe towards the apex which is nonspecific and may represent infectious, inflammatory, or changes secondary to underlying ILD. ------------------------------echo 2022---pasp 46     12/2023 pulm htn- on letairis and lasix- oxygen x 24hrs- stable from pulm perspective c/o abd spasms, constipation- follows GI clinic at [facility] UTD w/vacs

## 2023-12-19 NOTE — END OF VISIT
[FreeTextEntry3] :   I, Dr. Mary Perez  personally performed the evaluation and management (E/M) services for this established patient who presents today with (a) new problem(s)/exacerbation of (an) existing condition(s). That E/M includes conducting the clinically appropriate interval history &/or exam, assessing all new/exacerbated conditions, and establishing a new plan of care. Today, my LY, Keysha Dorado NP, , was here to observe my evaluation and management service for this new problem/exacerbated condition and follow the plan of care established by me going forward. [Time Spent: ___ minutes] : I have spent [unfilled] minutes of time on the encounter.

## 2023-12-19 NOTE — DISCUSSION/SUMMARY
[FreeTextEntry1] : -ASSESSMENT PLAN--------68 year old female Patient has history of scleroderma with positive Sjogrens antibodies. ------ She has interstitial lung disease and pulmonary hypertension. She will continue on oxygen, Lasix, LETARIS ------HAS NOT FOLLOW UP WITH Dr. Aguilar regarding her rheumatologic workup. -----------  HER ECHO AND CT ARE DONE AT [facility]   ON SUPPLEMENTAL        O2 2-3 liters per minute    1-------PAH--------WILL CONTINUE WITH LETAIRIS--and oxygen therapy---daily lasix- daily wts Will try to obtain records from [facility] of recent echo----- ECHO AT [facility] --PASP 46 NNEEDS A REPEAT RIGHT HEART CATH---- ---WILL Consider TO  ADD INHALED REMODULIN [   TYVASO  ]  FOR SECONDARY PULM HTN  -----  2 ---HAS ILD  --- scleroderma related ILD --- with a lingular nodule ------- repeat CT  chest  3--Labs drawn in our office today- CBC, CMP and pBNP 4--ADVISED  TO GET AN OPINION FROM LUNG TRANSPLANT POINT OF VIEW---ADVISED TO F/U DR BARBA -has not done so 5- abd pain- start flagyl- f/u wth GI 6- f/u in 6m  --Thanks for allowing me to participate in the care of this patient. Patient at this time will follow the above mentioned recommendations and return back for follow up visit. If you have any questions I can be reached at #[phone].   Mary Perez MD, [facility]

## 2023-12-20 LAB
ALBUMIN SERPL ELPH-MCNC: 4.3 G/DL
ALP BLD-CCNC: 103 U/L
ALT SERPL-CCNC: 7 U/L
ANION GAP SERPL CALC-SCNC: 15 MMOL/L
AST SERPL-CCNC: 16 U/L
BILIRUB SERPL-MCNC: 0.2 MG/DL
BUN SERPL-MCNC: 22 MG/DL
CALCIUM SERPL-MCNC: 10.2 MG/DL
CHLORIDE SERPL-SCNC: 97 MMOL/L
CO2 SERPL-SCNC: 27 MMOL/L
CREAT SERPL-MCNC: 1.13 MG/DL
EGFR: 53 ML/MIN/1.73M2
GLUCOSE SERPL-MCNC: 115 MG/DL
HCT VFR BLD CALC: 39 %
HGB BLD-MCNC: 12 G/DL
MCHC RBC-ENTMCNC: 27.5 PG
MCHC RBC-ENTMCNC: 30.8 GM/DL
MCV RBC AUTO: 89.2 FL
NT-PROBNP SERPL-MCNC: <36 PG/ML
PLATELET # BLD AUTO: 238 K/UL
POTASSIUM SERPL-SCNC: 4.7 MMOL/L
PROT SERPL-MCNC: 8.8 G/DL
RBC # BLD: 4.37 M/UL
RBC # FLD: 14.8 %
SODIUM SERPL-SCNC: 139 MMOL/L
WBC # FLD AUTO: 5.69 K/UL

## 2023-12-25 ENCOUNTER — NON-APPOINTMENT (OUTPATIENT)
Age: 68
End: 2023-12-25

## 2024-06-04 ENCOUNTER — APPOINTMENT (OUTPATIENT)
Dept: PULMONOLOGY | Facility: CLINIC | Age: 69
End: 2024-06-04
Payer: MEDICARE

## 2024-06-04 VITALS
HEART RATE: 76 BPM | BODY MASS INDEX: 29.95 KG/M2 | OXYGEN SATURATION: 98 % | TEMPERATURE: 98.2 F | DIASTOLIC BLOOD PRESSURE: 69 MMHG | RESPIRATION RATE: 16 BRPM | HEIGHT: 63 IN | WEIGHT: 169 LBS | SYSTOLIC BLOOD PRESSURE: 110 MMHG

## 2024-06-04 DIAGNOSIS — J84.10 PULMONARY FIBROSIS, UNSPECIFIED: ICD-10-CM

## 2024-06-04 DIAGNOSIS — M34.9 SYSTEMIC SCLEROSIS, UNSPECIFIED: ICD-10-CM

## 2024-06-04 DIAGNOSIS — R06.09 OTHER FORMS OF DYSPNEA: ICD-10-CM

## 2024-06-04 PROCEDURE — 94010 BREATHING CAPACITY TEST: CPT

## 2024-06-04 PROCEDURE — 36415 COLL VENOUS BLD VENIPUNCTURE: CPT

## 2024-06-04 PROCEDURE — ZZZZZ: CPT

## 2024-06-04 PROCEDURE — 99215 OFFICE O/P EST HI 40 MIN: CPT | Mod: 25

## 2024-06-04 RX ORDER — METRONIDAZOLE 500 MG/1
500 TABLET ORAL TWICE DAILY
Qty: 14 | Refills: 0 | Status: DISCONTINUED | COMMUNITY
Start: 2023-12-19 | End: 2024-06-04

## 2024-06-04 NOTE — DISCUSSION/SUMMARY
[FreeTextEntry1] : -ASSESSMENT PLAN--------68 year old female Patient has history of scleroderma with positive Sjogrens antibodies. ------ She has interstitial lung disease and pulmonary hypertension. She will continue on oxygen, Lasix, LETARIS ------HAS NOT FOLLOW UP WITH Dr. Aguilar regarding her rheumatologic workup. -----------  HER ECHO AND CT ARE DONE AT [facility]   ON SUPPLEMENTAL        O2 2-3 liters per minute    1-------PAH------LAST BNP WITH IN NORMAL LIMITS---- ---and oxygen therapy---daily lasix- daily wts Will try to obtain records from [facility] of recent echo----Needs repeat echo and BNP- has been normal.   Will stop letairis for now and repeat echo again      ----- REPEAT ECHO---- 2 ---HAS ILD  --- scleroderma related ILD --in the past has not been able to tolerate CellCept------ also CT scan 2022- with a lingular nodule ------- repeat CT  chest  3--Labs drawn in our office today- CBC, CMP and pBNP 4--ADVISED  TO GET AN OPINION FROM LUNG TRANSPLANT POINT OF VIEW---ADVISED TO F/U DR BARBA -has not done so 5- f/u in   --Thanks for allowing me to participate in the care of this patient. Patient at this time will follow the above mentioned recommendations and return back for follow up visit. If you have any questions I can be reached at #[phone].   Mary Perez MD, [facility]

## 2024-06-04 NOTE — HISTORY OF PRESENT ILLNESS
[Former] : former [TextBox_4] : ---69 yo F initially diagnosed with scleroderma back when she was in her 20s.  She has interstitial lung disease with pulmonary hypertension.  She had a RHC in 2008 which did not reveal  elevated PASP with  ? vasodilator responsiveness.  has scleroderma-  She also has history of significant esophageal involvement.  She underwent esophageal dilatation in 2009 which helped with her recurrent aspiration.  She also has history of sickle cell trait and history of hypothyroidism. Her exercise tolerance has remanned the same.  Medication wise, she is one, levothyroxine, furosemide, lisinopril, Letairis. and nebulizers. ----------She was previously on Ventavis & Revatio. ---NOW ON     Letaris  and Symbicort AND PROMISES TO BE COMPLIANT TO TREATMENT ---- ----  ----PFT------- --DEC 2019-------UNABLE TO DO A COMPLETE PFT-------9/1/15 restrictive lung defect w/bronchodilator response and severely decreased DLCO. ------  PFT  SEPT 2107----EVIDENCE OF RESTRICTIVE ND OBSTRUCTIVE LUNG DEFECT, DECREASED DLCO---- -PFT SPIROMETRY  2022---SUGGESTIO OF RSTRICTIVE ----  - 6 MWT  ---2018----SEPT 228m  2016  342   METERS 2020   6MWT  ON SUPPLEMENTAL O2     206 METERS  CT SCAN  CHEST      MARCH 2107--OUTSIDE  FACILITY---FILMS NOT AVAILABLE  FOR REVIEW  ----EMPHYSEMA  WITH BASILAR FIBROSIS IN THE SETTING OF SCLERODERMA---  CT CHEST 12/2020  --[facility]    peripheral and basilar end stage fibrotic , moderate and severe centrilobular emphysema ----- She is using 3l/min of oxygen at present and complaint with her medication.--------------   CT CHEST 8/2022 IMPRESSION:  Extensive pulmonary cystic changes, with a lower lobe predominance. These findings are not significantly changed since the 2008 comparison study with the exception of mildly increased reticular opacities in the left upper lobe towards the apex which is nonspecific and may represent infectious, inflammatory, or changes secondary to underlying ILD.  Similar-appearing esophageal dilation with layering fluid. These findings are compatible with patient known history of scleroderma.  There is a 6 mm lingular nodule, slightly increased in size since 2012 study.  Recommend three-month follow-up noncontrast CT chest for further evaluation of the above findings or submission of most recent outside comparison chest CT for comparative purposes. If more recent chest CTs are made available, an addendum can be issued.  ECHO 8/2022 CONCLUSIONS: 1. Normal mitral valve. Minimal mitral regurgitation. 2. Normal left ventricular internal dimensions and wall thicknesses. 3. Normal left ventricular systolic function. No segmental wall motion abnormalities. 4. Mild diastolic dysfunction (Stage I). 5. Normal right ventricular size and function. 6. Estimated right ventricular systolic pressure equals 46 mm Hg, assuming right atrial pressure equals 10 mm Hg, consistent with mild pulmonary hypertension.  -- ------MAR 2018-------is having cough and yellow sputum since 1 week ago----denies any fever  or chills----is on 2L oxygen-----is on letairis  -july 2019 PAH-------doing well from pulmonary stand pint-----on letairis----tolerating it well------pt states she had a CT scan done at [facility] [NO FILMS AVAILABLE]-----she's on oxygen at 2 lpm x 24 hrs -NNEDS REPAT ECHO  -----DEC  2019-----------doing well from pulmonary stand pint-----on letairis----tolerating it well------pt states she had a CT scan done and ECHO  at [facility] [NO FILMS AVAILABLE]-----she's on oxygen at 2 lpm x 24 hrs------   echo 12/2020 ---[facility]---est pasp 36mmHg 1/2021 pulm htn- on letairis, she has no current resp complaints, using oxygen at 2 lpm contiuously Follows [facility]- gets diagnostic tests done there  7/2021 pulm htn d/t scleroderma- on letairis and diuretics- on oxygen- stable from resp perspective c/o abd pain- following GI - was told she needs a colonoscopy ("stool backed up") She has not yet received covid vac    11/2021 pulm htn d/t scleroderma- on letairis and diuretics- on oxygen- stable from resp perspective. Getting testing at [facility]. She is up to date w/covid and influenza vac   3/2022  pulm htn d/t scleroderma- on letairis and diuretics- on oxygen @ 2 lpm- stable from resp perspective. STEEN unchanged. Getting testing at [facility]. She is up to date w/covid vac x 2 --needs  echo , ct and  pft  7/2022: Reports more fatigue since March. Continues to use 3L of O2, reports after exertion, her O2 drops from 99- 93% Continues letarais 10mg daily and 20mg of furosemide. Reports more LE swelling when she feels short of breath.  Reports echo this week at [place] on Monday. Continues to use nebulizer at night during the summer time and as needed during the day.   9/2022  pulm htn d/t scleroderma- on letairis and diuretics- on oxygen @ 2 lpm- stable from resp perspective. s/p hospitalization- doing well except for chronic abd cramps-------------------UTD w/influenza vac -----------ct 2022--------- Extensive pulmonary cystic changes, with a lower lobe predominance. These findings are not significantly changed since the 2008 comparison study with the exception of mildly increased reticular opacities in the left upper lobe towards the apex which is nonspecific and may represent infectious, inflammatory, or changes secondary to underlying ILD. ------------------------------echo 2022---pasp 46     12/2023 pulm htn- on letairis and lasix- oxygen x 24hrs- stable from pulm perspective c/o abd spasms, constipation- follows GI clinic at [facility] UTD w/vacs   6/2024 patient has history of scleroderma interstitial lung disease with pulmonary hypertension  MILD ---pulm htn- on letairis and lasix, using oxygen-------- she has never been on CellCept for ILD apparently in the past she was not able to tolerate it resp wise she is stable

## 2024-06-08 LAB
ALBUMIN SERPL ELPH-MCNC: 3.9 G/DL
ALP BLD-CCNC: 96 U/L
ALT SERPL-CCNC: 7 U/L
ANION GAP SERPL CALC-SCNC: 14 MMOL/L
AST SERPL-CCNC: 15 U/L
BILIRUB SERPL-MCNC: 0.3 MG/DL
BUN SERPL-MCNC: 11 MG/DL
CALCIUM SERPL-MCNC: 9.4 MG/DL
CHLORIDE SERPL-SCNC: 99 MMOL/L
CO2 SERPL-SCNC: 26 MMOL/L
CREAT SERPL-MCNC: 0.86 MG/DL
EGFR: 74 ML/MIN/1.73M2
GLUCOSE SERPL-MCNC: 96 MG/DL
HCT VFR BLD CALC: 35.2 %
HGB BLD-MCNC: 10.7 G/DL
MCHC RBC-ENTMCNC: 26.7 PG
MCHC RBC-ENTMCNC: 30.4 GM/DL
MCV RBC AUTO: 87.8 FL
NT-PROBNP SERPL-MCNC: 66 PG/ML
PLATELET # BLD AUTO: 196 K/UL
POTASSIUM SERPL-SCNC: 4 MMOL/L
PROT SERPL-MCNC: 8.1 G/DL
RBC # BLD: 4.01 M/UL
RBC # FLD: 15.2 %
SODIUM SERPL-SCNC: 138 MMOL/L
WBC # FLD AUTO: 4.66 K/UL

## 2024-07-23 NOTE — HISTORY OF PRESENT ILLNESS
[Former] : former [TextBox_4] : ---67 yo F initially diagnosed with scleroderma back when she was in her 20s.  She has interstitial lung disease with pulmonary hypertension.  She had a RHC in 2008 which did not reveal  elevated PASP with  ? vasodilator responsiveness.  has scleroderma-  She also has history of significant esophageal involvement.  She underwent esophageal dilatation in 2009 which helped with her recurrent aspiration.  She also has history of sickle cell trait and history of hypothyroidism. Her exercise tolerance has remanned the same.  Medication wise, she is one, levothyroxine, furosemide, lisinopril, Letairis. and nebulizers. ----------She was previously on Ventavis & Revatio. ---NOW ON     Letaris  and Symbicort AND PROMISES TO BE COMPLIANT TO TREATMENT ---- ----  ----PFT------- --DEC 2019-------UNABLE TO DO A COMPLETE PFT-------9/1/15 restrictive lung defect w/bronchodilator response and severely decreased DLCO. ------  PFT  SEPT 2107----EVIDENCE OF RESTRICTIVE ND OBSTRUCTIVE LUNG DEFECT, DECREASED DLCO---- -PFT SPIROMETRY  2022---SUGGESTIO OF RSTRICTIVE ----  - 6 MWT  ---2018----SEPT 228m  2016  342   METERS 2020   6MWT  ON SUPPLEMENTAL O2     206 METERS  CT SCAN  CHEST      MARCH 2107--OUTSIDE  FACILITY---FILMS NOT AVAILABLE  FOR REVIEW  ----EMPHYSEMA  WITH BASILAR FIBROSIS IN THE SETTING OF SCLERODERMA---  CT CHEST 12/2020  --[facility]    peripheral and basilar end stage fibrotic , moderate and severe centrilobular emphysema ----- She is using 3l/min of oxygen at present and complaint with her medication.--------------   CT CHEST 8/2022 IMPRESSION:  Extensive pulmonary cystic changes, with a lower lobe predominance. These findings are not significantly changed since the 2008 comparison study with the exception of mildly increased reticular opacities in the left upper lobe towards the apex which is nonspecific and may represent infectious, inflammatory, or changes secondary to underlying ILD.  Similar-appearing esophageal dilation with layering fluid. These findings are compatible with patient known history of scleroderma.  There is a 6 mm lingular nodule, slightly increased in size since 2012 study.  Recommend three-month follow-up noncontrast CT chest for further evaluation of the above findings or submission of most recent outside comparison chest CT for comparative purposes. If more recent chest CTs are made available, an addendum can be issued.  ECHO 8/2022 CONCLUSIONS: 1. Normal mitral valve. Minimal mitral regurgitation. 2. Normal left ventricular internal dimensions and wall thicknesses. 3. Normal left ventricular systolic function. No segmental wall motion abnormalities. 4. Mild diastolic dysfunction (Stage I). 5. Normal right ventricular size and function. 6. Estimated right ventricular systolic pressure equals 46 mm Hg, assuming right atrial pressure equals 10 mm Hg, consistent with mild pulmonary hypertension.  -- ------MAR 2018-------is having cough and yellow sputum since 1 week ago----denies any fever  or chills----is on 2L oxygen-----is on letairis  -july 2019 PAH-------doing well from pulmonary stand pint-----on letairis----tolerating it well------pt states she had a CT scan done at [facility] [NO FILMS AVAILABLE]-----she's on oxygen at 2 lpm x 24 hrs -NNEDS REPAT ECHO  -----DEC  2019-----------doing well from pulmonary stand pint-----on letairis----tolerating it well------pt states she had a CT scan done and ECHO  at [facility] [NO FILMS AVAILABLE]-----she's on oxygen at 2 lpm x 24 hrs------   echo 12/2020 ---[facility]---est pasp 36mmHg 1/2021 pulm htn- on letairis, she has no current resp complaints, using oxygen at 2 lpm contiuously Follows [facility]- gets diagnostic tests done there  7/2021 pulm htn d/t scleroderma- on letairis and diuretics- on oxygen- stable from resp perspective c/o abd pain- following GI - was told she needs a colonoscopy ("stool backed up") She has not yet received covid vac    11/2021 pulm htn d/t scleroderma- on letairis and diuretics- on oxygen- stable from resp perspective. Getting testing at [facility]. She is up to date w/covid and influenza vac   3/2022  pulm htn d/t scleroderma- on letairis and diuretics- on oxygen @ 2 lpm- stable from resp perspective. STEEN unchanged. Getting testing at [facility]. She is up to date w/covid vac x 2 --needs  echo , ct and  pft  7/2022: Reports more fatigue since March. Continues to use 3L of O2, reports after exertion, her O2 drops from 99- 93% Continues letarais 10mg daily and 20mg of furosemide. Reports more LE swelling when she feels short of breath.  Reports echo this week at [place] on Monday. Continues to use nebulizer at night during the summer time and as needed during the day.   9/2022  pulm htn d/t scleroderma- on letairis and diuretics- on oxygen @ 2 lpm- stable from resp perspective. s/p hospitalization- doing well except for chronic abd cramps-------------------UTD w/influenza vac -----------ct 2022--------- Extensive pulmonary cystic changes, with a lower lobe predominance. These findings are not significantly changed since the 2008 comparison study with the exception of mildly increased reticular opacities in the left upper lobe towards the apex which is nonspecific and may represent infectious, inflammatory, or changes secondary to underlying ILD. ------------------------------echo 2022---pasp 46     12/2023 pulm htn- on letairis and lasix- oxygen x 24hrs- stable from pulm perspective c/o abd spasms, constipation- follows GI clinic at [facility] UTD w/vacs   6/2024 patient has history of scleroderma interstitial lung disease with pulmonary hypertension  MILD ---pulm htn- on letairis and lasix, using oxygen-------- she has never been on CellCept for ILD apparently in the past she was not able to tolerate it resp wise she is stable

## 2024-07-23 NOTE — DISCUSSION/SUMMARY
[FreeTextEntry1] : -ASSESSMENT PLAN--------68 year old female Patient has history of scleroderma with positive Sjogrens antibodies. ------ She has interstitial lung disease and pulmonary hypertension. She will continue on oxygen, Lasix, LETARIS ------HAS NOT FOLLOW UP WITH Dr. Aguilar regarding her rheumatologic workup. -----------  HER ECHO AND CT ARE DONE AT [facility]   ON SUPPLEMENTAL        O2 2-3 liters per minute    1-------PAH------LAST BNP WITH IN NORMAL LIMITS---- ---and oxygen therapy---daily lasix- daily wts Will try to obtain records from [facility] of recent echo----Needs repeat echo and BNP- has been normal.   Will stop letairis for now and repeat echo again      ----- REPEAT ECHO---- 2 ---HAS ILD  --- scleroderma related ILD --in the past has not been able to tolerate CellCept------ also CT scan 2022- with a lingular nodule ------- repeat CT  chest  3--Labs drawn in our office today- CBC, CMP and pBNP 4--ADVISED  TO GET AN OPINION FROM LUNG TRANSPLANT POINT OF VIEW---ADVISED TO F/U DR BARBA -has not done so 5- f/u in   --Thanks for allowing me to participate in the care of this patient. Patient at this time will follow the above mentioned recommendations and return back for follow up visit. If you have any questions I can be reached at #[phone].   Mary Perez MD, [facility]

## 2024-07-29 ENCOUNTER — APPOINTMENT (OUTPATIENT)
Dept: PULMONOLOGY | Facility: CLINIC | Age: 69
End: 2024-07-29
